# Patient Record
Sex: FEMALE
[De-identification: names, ages, dates, MRNs, and addresses within clinical notes are randomized per-mention and may not be internally consistent; named-entity substitution may affect disease eponyms.]

---

## 2019-07-15 ENCOUNTER — HOSPITAL ENCOUNTER (OUTPATIENT)
Dept: HOSPITAL 88 - OR | Age: 54
Discharge: HOME | End: 2019-07-15
Attending: INTERNAL MEDICINE
Payer: COMMERCIAL

## 2019-07-15 VITALS — SYSTOLIC BLOOD PRESSURE: 126 MMHG | DIASTOLIC BLOOD PRESSURE: 63 MMHG

## 2019-07-15 DIAGNOSIS — R13.10: ICD-10-CM

## 2019-07-15 DIAGNOSIS — F32.9: ICD-10-CM

## 2019-07-15 DIAGNOSIS — E03.9: ICD-10-CM

## 2019-07-15 DIAGNOSIS — I73.9: ICD-10-CM

## 2019-07-15 DIAGNOSIS — Z12.11: Primary | ICD-10-CM

## 2019-07-15 DIAGNOSIS — K22.2: ICD-10-CM

## 2019-07-15 DIAGNOSIS — Z80.0: ICD-10-CM

## 2019-07-15 DIAGNOSIS — K29.50: ICD-10-CM

## 2019-07-15 DIAGNOSIS — Z01.810: ICD-10-CM

## 2019-07-15 DIAGNOSIS — K20.9: ICD-10-CM

## 2019-07-15 DIAGNOSIS — Z87.11: ICD-10-CM

## 2019-07-15 DIAGNOSIS — Z86.010: ICD-10-CM

## 2019-07-15 DIAGNOSIS — K64.8: ICD-10-CM

## 2019-07-15 PROCEDURE — 45378 DIAGNOSTIC COLONOSCOPY: CPT

## 2019-07-15 PROCEDURE — 43239 EGD BIOPSY SINGLE/MULTIPLE: CPT

## 2019-07-15 PROCEDURE — 93005 ELECTROCARDIOGRAM TRACING: CPT

## 2019-07-15 PROCEDURE — 43450 DILATE ESOPHAGUS 1/MULT PASS: CPT

## 2019-07-15 NOTE — XMS REPORT
[facility]

                             Created on: 2019



Crystal Elizabeth

External Reference #: [id number]

: 1965

Sex: Female



Demographics







                          Address                   [address] [place] [address]  [address]

 

                          Home Phone                [phone]

 

                          Preferred Language        Unknown

 

                          Marital Status            Unknown

 

                          Hindu Affiliation     Unknown

 

                          Race                      Unknown

 

                          Ethnic Group              UNK





Author







                          Author                    Nahid Crystal

 

                          Organization              [employer]

 

                          Address                   Unknown

 

                          Phone                     Unavailable







Care Team Providers







                    Care Team Member Name    Role                Phone

 

                    Nahid Crystal     CP                  Unavailable



                                                                



Allergies

          No Known Allergies                                                    
                                   



Problems

          





             Problem Type    Condition    Code         Onset Dates    Condition Status

 

             Problem      Depression    F32.9                     Active

 

             Problem      History of abnormal cervical Pap smear    Z87.898                   Active

 

             Problem      Vitamin D deficiency    E55.9                     Active

 

             Problem      BMI 34.0-34.9,adult    Z68.34                    Active

 

             Problem      Acquired hypothyroidism    E03.9                     Active

 

             Problem      Obstructive sleep apnea    G47.33                    Active

 

             Problem      Uses [place] Islander as primary spoken language    Z78.9                     Active

 

             Problem      Positive MARY LOU (antinuclear antibody)    R76.8                     Active

 

             Problem      Prediabetes    R73.03                    Active

 

                    Problem             Recurrent major depressive disorder, remission status unspecified    F33.9

                                                    Active

 

             Problem      Inflammatory arthritis    M19.90                    Active

 

             Problem      Lupus        M32.9                     Active

 

             Assessment    Obstructive sleep apnea    G47.33                    Active

 

             Problem      Anxiety      F41.9                     Active

 

             Problem      Insomnia     G47.00                    Active



                                                                                
                                                                                
                                                                  



Medications

          No Known Medications                                                  
                           



Results

          No Known Results                                                      
             



Summary Purpose

          [employer]ClinicalWorks Submission

## 2019-07-15 NOTE — XMS REPORT
[facility]

                             Created on: 2014



Crystal Elizabeth

External Reference #: [id number]

: 1965

Sex: Female



Demographics







                          Address                   [address] [place]  [address]

 

                          Home Phone                [phone]

 

                          Preferred Language        Unknown

 

                          Marital Status            Unknown

 

                          Congregational Affiliation     Unknown

 

                          Race                      Unknown

 

                          Ethnic Group              /Latin





Author







                          Author                    ~~Sanjuana Yeboah

 

                          [facility]              [employer]

 

                          Address                   Unknown

 

                          Phone                     Unavailable







Care Team Providers







                    Care Team Member Name    Role                Phone

 

                    ~~Sanjuana Yeboah                      Unavailable



                                            



Encounters

          





                    Encounter           Location            Date

 

                          LEFT KNEE PAIN/ SWELLLING    [facility]

                                        Oct 29, 2014

 

                          Needs call back from Medical Staff    [facility]                             Oct 30, 2014

 

                    Needs referral      [facility]    2014



                                                                                
                           



Problems

          





             Problem Type    Condition    ICD-9 Code    Onset Dates    Condition Status

 

             Problem      BMI 31.0-31.9,adult    V85.31                    Active

 

             Problem      Depression    311                       Active

 

             Problem      Obesity      278.00                    Active

 

             Problem      Abnormal pap    796.9                     Active



                                                                                
                                     



Medications

          





        Medication    Code System    Code    Instructions    Start Date    End Date    Status    Dosage



 

           Medrol (Young)    MEDISPAN    [address]-23    4 mg Orally as directed    Oct 30, 2014    Oct

 31, 2014                 Active                    as directed



                                                                                
       



Social History

          





                    Social History Element    Qualifiers          Date Reported

 

                    children            .  3                Oct 29, 2014

 

                    Tobacco Use:        .  Are you a: never smoker    Oct 29, 2014

 

                    Marital Status:     .           Oct 29, 2014

 

                    Do you drink alcohol?    .  Status: Yes, How often? Socially    Oct 29, 2014

 

                    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



                                                                                
                           



Summary Purpose

          eClinicalWorks Submission

## 2019-07-15 NOTE — XMS REPORT
[facility]

                             Created on: 2016



Crystal Elizabeth

External Reference #: [id number]

: 1965

Sex: Female



Demographics







                          Address                   [address] [place] [address] [place]  [address]

 

                          Home Phone                [phone]

 

                          Preferred Language        Unknown

 

                          Marital Status            Unknown

 

                          Catholic Affiliation     Unknown

 

                          Race                      Unknown

 

                          Ethnic Group              /Latin





Author







                          Author                    Bianka Rocha

 

                          Organization              [employer]

 

                          Address                   Unknown

 

                          Phone                     Unavailable







Care Team Providers







                    Care Team Member Name    Role                Phone

 

                    Bianka Rocha                          Unavailable



                                                                



Allergies, Adverse Reactions, Alerts

          





                    Substance           Reaction            Event Type

 

                    N.K.D.A.            Info Not Available    Non Drug Allergy



                                                                    



Encounters

          





                    Encounter           Location            Date

 

                    WWE                 [facility]    Dec 29, 2014

 

                          SS STUDY QUESTIONAIRE     [facility]

                                        2015

 

                    LAB RESULTS         [facility]    2015

 

                    Unknown             [facility]    2015



 

                          LEFT KNEE PAIN/ SWELLLING    [facility]

                                        Oct 29, 2014

 

                    physical, wwe       [facility]    Dec 30,

 2015

 

                          Needs call back from Medical Staff    [facility]                             Oct 30, 2014

 

                          Dizziness, loosing balance when trying to stand up    [facility]           2016

 

                    Needs referral      [facility]    2014

 

                          insomnia and stress, headaches     [facility]                              Dec 28, 2015

 

                    H A/ SORE THROAT    [facility]    2015

 

                    left eye            [facility]    May 22, 2015





                                                                                
                                                                                
                                    



Problems

          





             Problem Type    Condition    ICD-9 Code    Onset Dates    Condition Status

 

             Problem      Anxiety      300.00                    Active

 

             Problem      Vitamin d deficiency    268.9                     Active

 

             Problem      Hyperlipidemia    272.4                     Active

 

             Problem      Menopause    Z78.0                     Active

 

             Problem      Insomnia     G47.00                    Active

 

             Problem      Vitamin D deficiency    E55.9                     Active

 

             Problem      History of abnormal Pap smear    V13.29                    Active

 

             Problem      Positive MARY LOU (antinuclear antibody)    795.79                    Active

 

             Problem      Depression    F32.9                     Active

 

             Problem      Anxiety      F41.9                     Active

 

             Assessment    Vertigo      R42                       Active

 

             Assessment    Dizziness    R42                       Active

 

             Assessment    Menorrhagia    N92.0                     Active

 

             Problem      BMI 31.0-31.9,adult    V85.31                    Active

 

             Problem      Obesity      278.00                    Active

 

             Problem      Spondyloarthropathy    721.90                    Active

 

             Problem      Insomnia     307.41                    Active

 

             Problem      Depression    311                       Active

 

             Problem      Prediabetes    790.29                    Active



                                                                                
                                                                                
                                                                                
                         



Medications

          





        Medication    Code System    Code    Instructions    Start Date    End Date    Status    Dosage



 

        Mirtazapine    MEDISPAN    [address]-40    45                      Active    1 tablet before bedtime

 in the evening

 

          Venlafaxine HCl ER    [facility]    [address]-22    75 mg Orally Once a day                        Active

                                        1 capsule with food

 

             HydrOXYzine HCl    [facility]     [address]-07    50 mg Orally at hour of sleep    Dec 28,

 2015                                   Active              1 tablet as needed

 

        Melatonin    [facility]    [address]-47    3 MG Orally                     Active    1 capsule at bedtime

 as needed with food



                                                                                
                                     



Social History

          





                    Social History Element    Qualifiers          Date Reported

 

                    Last Colonoscopy:    .  14

 

                    Ethnicity           .  Status , Is english your primary language? No Greenlandic    2016

 

                    children            .  3                2016

 

                    Flu Vaccine:        .  2015

 

                    Tobacco Use:        .  Are you a: never smoker    2016

 

                    Marital Status:     .           2016

 

                    Caffeine intake?    .  Status: Yes, What type: Coffee    2016

 

                    Do you exercise?    .  Answer: Yes, Type: walking, biking    2016

 

                    Do you drink alcohol?    .  Status: Yes, How often? Socially    2016

 

                    Occupation:         employed.  Housekeeping    2016



                                                                                
                                                                                
                          



Vital Signs

          





                          Date/Time:                2016

 

                          Weight                    164 lbs

 

                          Height                    61 in

 

                          Temperature               98.0 F

 

                          Cardiac Monitoring Heart Rate    60 /min

 

                          Blood Pressure Diastolic    60 mm Hg

 

                          Blood Pressure Systolic    112 mm Hg



                                                                    



Summary Purpose

          eClinicalWorks Submission

## 2019-07-15 NOTE — XMS REPORT
[facility]

                             Created on: 2014



Crystal Elizabeth

External Reference #: [id number]

: 1965

Sex: Female



Demographics







                          Address                   [address] [place] [address] [place]  [address]

 

                          Home Phone                [phone]

 

                          Preferred Language        Unknown

 

                          Marital Status            Unknown

 

                          Synagogue Affiliation     Unknown

 

                          Race                      Unknown

 

                          Ethnic Group              /Latin





Author







                          Author                    Cynthia Hedrick

 

                          Organization              [employer]

 

                          Address                   Unknown

 

                          Phone                     Unavailable







Care Team Providers







                    Care Team Member Name    Role                Phone

 

                    Cynthia Hedrick                      Unavailable



                                            



Encounters

          





                    Encounter           Location            Date

 

                          LEFT KNEE PAIN/ SWELLLING    [facility]

                                        Oct 29, 2014

 

                          Needs call back from Medical Staff    [facility]                             Oct 30, 2014

 

                    Needs referral      [facility]    2014



                                                                                
                           



Problems

          





             Problem Type    Condition    ICD-9 Code    Onset Dates    Condition Status

 

             Problem      BMI 31.0-31.9,adult    V85.31                    Active

 

             Problem      Depression    311                       Active

 

             Problem      Obesity      278.00                    Active

 

             Problem      Abnormal pap    796.9                     Active



                                                                                
                                     



Social History

          





                    Social History Element    Qualifiers          Date Reported

 

                    children            .  3                Oct 29, 2014

 

                    Tobacco Use:        .  Are you a: never smoker    Oct 29, 2014

 

                    Marital Status:     .           Oct 29, 2014

 

                    Do you drink alcohol?    .  Status: Yes, How often? Socially    Oct 29, 2014

 

                    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



                                                                                
                           



Summary Purpose

          [employer]ClinicalWorks Submission

## 2019-07-15 NOTE — XMS REPORT
Clinical Summary

                             Created on: 2018



Crystal Elizabeth

External Reference #: [id number]

: 1965

Sex: Female



Demographics







                          Address                   [address] [place]  [address]

 

                          Home Phone                [phone]

 

                          Preferred Language        Unknown

 

                          Marital Status            Single

 

                          Sikh Affiliation     CAT

 

                          Race                      Unknown

 

                          Ethnic Group              Unknown





Author







                          Author                    [facility]

 

                          Organization              [facility]

 

                          Address                   Unknown

 

                          Phone                     Unavailable







Support







                Name            Relationship    Address         Phone

 

                    HUGO PARRA    ECON                [address] [place]  [address]                [phone]

 

                    MITZY KING    ECON                [address] [place]  [address]                [phone]







Care Team Providers







                    Care Team Member Name    Role                Phone

 

                          PCP                       Unavailable







Allergies

No Known Allergies



Current Medications







      



           Prescription     Sig.      Disp.     Refills     Start     End Date     Status



                                         Date  

 

      



                     levothyroxine (SYNTHROID)        20            Active



                           50 mcg tablet             18  

 

      



              folic acid (FOLVITE) 1 mg     Take 1 tablet by mouth     90 tablet     3            20  

                                         Active



                     tabletIndications:     daily.              18  



                                         Inflammatory arthritis      

 

      



            Cyclobenzaprine     Take 1 tablet by mouth     30 tablet     1          20      Active



                     (FLEXERIL) 5 mg     nightly at bedtime as       18  



                           tabletIndications: Low     needed for Muscle Spasms.     



                                         back pain at multiple      



                                         sites      

 

      



              naproxen (NAPROSYN) 250     Take 1 tablet by mouth 2     60 tablet     4            20  

                                         Active



                     mg tabletIndications:     times daily (with meals).       18  



                                         Arthralgia of shoulder,      



                                         unspecified laterality,      



                                         Low back pain at multiple      



                                         sites      

 

      



            hydroxychloroquine     Take 1 tablet by mouth     90 tablet     3          10/22/20      Active





                     (PLAQUENIL) 200 mg     daily.              18  



                                         tabletIndications:      



                                         Systemic lupus      



                                         erythematosus,      



                                         unspecified SLE type,      



                                         unspecified organ      



                                         involvement status,      



                                         Sjogren's syndrome with      



                                         keratoconjunctivitis      



                                         sicca      

 

      



                     traZODone (DESYREL) 50 mg     Take 50 mg by mouth at         Active



                           tablet                    bedtime nightly.     

 

      



              methotrexate (RHEUMATREX)     Take 1 tablet by mouth     72 tablet     0            11/15/20  

                                         Active



                     2.5 mg tabletIndications:     weekly.             18  



                                         Medication refill      

 

      



            citalopram (CELEXA) 40 mg     Take 1 tablet by mouth     30 tablet     1          11/16/20    

 01/15/20                                Active



                 tabletIndications: MDD     daily for 60 days.       18              19 



                                         (major depressive      



                                         disorder), recurrent      



                                         episode, moderate      

 

      



            traZODone (DESYREL) 100     Take 2 tablets by mouth     60 tablet     1          11/16/20     

01/15/20                                 Active



                 mg tabletIndications: MDD     at bedtime nightly for 60       18              19 



                           (major depressive         days.     



                                         disorder), recurrent      



                                         episode, moderate      

 

      



                 METRONIDAZOLE 500 MG TAB     take 1 tablet (500 mg) by        20        Discontin



                     oral route every 8 hours        18                  ued

 

      



                 CIPROFLOXACIN 500 MG TAB     take 1 tablet (500 mg) by        20        Discontin



                     oral route every 12 hours        18                  ued

 

      



           PREVACID 30 MG     take 1 capsule (30 mg) by     30        6         20     Discontin





              CAPIndications: Reflux     oral route once daily       10           18           ued



                                         before a meal     

 

      



              amitriptyline (ELAVIL) 25     Take 2 tablets by mouth     60 tablet     1            20                  Discontin



              mg tabletIndications:     at bedtime nightly.       18           18           ued



                                         Insomnia, unspecified      



                                         type      

 

      



              methotrexate (RHEUMATREX)     Take 6 tablets by mouth     24 tablet     4            08/24/20 

                           10/22/20                  Discontin



              2.5 mg tabletIndications:     weekly.       18           18           ued



                                         Inflammatory arthritis      

 

      



                 citalopram (CELEXA) 20 mg     Take 20 mg by mouth        20        Discontin



                 tablet          daily.          18              ued







Active Problems





No known active problems



Encounters







    



              Date         Type         Specialty     Care Team     Description

 

    



                 2018      Hospital        Radiology       Nupur Henry MD 



                                         Encounter   

 

    



              2018     Office Visit     Psychiatry     Bree Gómez MD     MDD (major depressive





                           Nupur Henry MD     disorder), recurrent



                                         episode, moderate



                                         (Primary Dx)

 

    



              2018     Orders Only     Psychiatry     Bree Gómez MD     MDD (major depressive





                                         disorder), recurrent



                                         episode, moderate

 

    



                 2018      Telephone       Oseas Escalona     Interpretation

 

    



              2018     Refill       Family Practice     Gretchen Vázquez LVN     Medication refill





                                         (Primary Dx)

 

    



              10/22/2018     Office Visit     Rheumatology     Nupur Henry MD     Systemic

 lupus



                                         erythematosus,



                                         unspecified SLE type,



                                         unspecified organ



                                         involvement status



                                         (Primary Dx);



                                         Inflammatory arthritis;



                                         Urticaria, unspecified;



                                         Bilateral calcific



                                         tendinitis of shoulders;



                                         Abnormal chest x-ray;



                                         DDD (degenerative disc



                                         disease), lumbar;



                                         Abnormal LFTs;



                                         Sjogren's syndrome with



                                         keratoconjunctivitis



                                         sicca;



                                         Adjustment disorder with



                                         depressed mood

 

    



                 2018      Ancillary       Radiology       Eleanor Agarwal MD 



                                         Procedure   

 

    



              2018     Office Visit     Rheumatology     Nupur Henry MD     Systemic

 lupus



                                         erythematosus,



                                         unspecified SLE type,



                                         unspecified organ



                                         involvement status



                                         (Primary Dx);



                                         Inflammatory arthritis;



                                         Dietary counseling for



                                         Above / Below Normal BMI;





                                         Exercise counseling for



                                         Above Normal BMI Only!;



                                         BMI 34.0-34.9,adult;



                                         Arthralgia of shoulder,



                                         unspecified laterality;



                                         Low back pain at multiple



                                         sites;



                                         Screening for



                                         tuberculosis

 

    



              2018     Refill       Psychiatry     Evangelina Flores MD     Insomnia, unspecified





                                         type

 

    



              2018     Office Visit     Psychiatry     Evangelina Flores MD     Patient left 

without



                                         being seen (Primary Dx)

 

    



              2018     Hospital     Lab          Evangelina Flores MD     Insomnia, unspecified



                           Encounter                 type;



                                         MDD (major depressive



                                         disorder), recurrent



                                         episode, moderate

 

    



              2018     Office Visit     Psychiatry     Evangelina Flores MD     MDD (major depressive





                                         disorder), recurrent



                                         episode, moderate



                                         (Primary Dx);



                                         Insomnia, unspecified



                                         type

 

    



                 2018      Telephone       Angelica Palmer     Interpretation



after 2017



Social History







    



              Tobacco Use     Types        Packs/Day     Years Used     Date

 

    



                                         Never Smoker    

 

    



                                         Smokeless Tobacco: Never   



                                         Used   









                                        Tobacco Cessation: Counseling Given: Yes











   



                 Alcohol Use     Drinks/Week     oz/Week         Comments

 

   



                           Yes                       occasional









 



                           Sex Assigned at Birth     Date Recorded

 

 



                                         Not on file 







Last Filed Vital Signs







  



                     Vital Sign          Reading             Time Taken

 

  



                     Blood Pressure      99/46               2018  9:08 AM CST

 

  



                     Pulse               58                  2018  9:08 AM CST

 

  



                     Temperature         36.8 C (98.3 F)     2018  9:08 AM CST

 

  



                     Respiratory Rate     18                  2018  9:08 AM CST

 

  



                     Oxygen Saturation     96%                 2018  2:58 PM CDT

 

  



                     Inhaled Oxygen      -                   -



                                         Concentration  

 

  



                     Weight              78 kg (172 lb)      2018  9:08 AM CST

 

  



                     Height              154.9 cm (5' 1")     2018  9:08 AM CST

 

  



                     Body Mass Index     32.5                2018  9:08 AM CST







Plan of Treatment







    



              Date         Type         Specialty     Care Team     Description

 

    



                 2018      Appointment      Nupur Henry MD     full pft 6mwt



                                         [address] 



                                         [place] [address] [phone] (Fax) 

 

    



                 2019      Office Visit     Psychiatry      Bree Gómez MD 



                                         [facility]ry[facility] 



                                         [address] 



                                         [place], [place] [address] [phone] (Fax) 

 

    



                 2019      Office Visit     Psychology      Bree Gómez MD 



                                         [facility]iatry[facility] 



                                         [address] 



                                         [place], [place] [address] [phone] (Fax)

 



                                         Arabella Faria 



                                         [address], [place] [address] [phone] (Fax) 

 

    



                 2019      Office Visit     Rheumatology     Colon-Nupur West MD 



                                         [address] 



                                         [place], [place] [address] [phone] (Fax) 









   



                 Health Maintenance     Due Date        Last Done       Comments

 

   



                           Cervical Cancer Scrn (3     1984  



                                         Yrs)   

 

   



                           Breast Cancer Scrn        2003  



                                         (Yearly)   

 

   



                     Colorectal Cancer Scrn     2013          06/10/2010 



                                         Annual (FIT/FOBT) Age 50   



                                         to 73   

 

   



                           IMM Influenza Seasonal     10/01/2018  



                                         Oct to March (>/=19 yrs)   







Procedures







    



              Procedure Name     Priority     Date/Time     Associated Diagnosis     Comments

 

    



              CT CHEST W CONTRAST     Routine      2018     Systemic lupus     Results for this



                     10:01 AM CST        erythematosus,      procedure are in the



                           unspecified SLE type,     results section.



                                         unspecified organ 



                                         involvement status 



                                         Abnormal chest x-ray 

 

    



              UREA NITROGEN/CREA     Routine      10/31/2018     Systemic lupus     Results for this



                     1:59 PM CDT         erythematosus,      procedure are in the



                           unspecified SLE type,     results section.



                                         unspecified organ 



                                         involvement status 

 

    



              ANTI DSDNA BY CRITHIDIA     Routine      10/31/2018     Systemic lupus     Results for this





                     1:59 PM CDT         erythematosus,      procedure are in the



                           unspecified SLE type,     results section.



                                         unspecified organ 



                                         involvement status 

 

    



              C-REACTIVE PROT     Routine      10/31/2018     Systemic lupus     Results for this



                     1:59 PM CDT         erythematosus,      procedure are in the



                           unspecified SLE type,     results section.



                                         unspecified organ 



                                         involvement status 

 

    



              RA FACTOR     Routine      10/31/2018     Sjogren's syndrome with     Results for this



                     1:59 PM CDT         keratoconjunctivitis     procedure are in the



                           sicca                     results section.

 

    



              SJOGREN'S AB     Routine      10/31/2018     Sjogren's syndrome with     Results for this





                     1:59 PM CDT         keratoconjunctivitis     procedure are in the



                           sicca                     results section.

 

    



              CBC/DIFF     Routine      10/31/2018     Systemic lupus     Results for this



                     1:59 PM CDT         erythematosus,      procedure are in the



                           unspecified SLE type,     results section.



                                         unspecified organ 



                                         involvement status 

 

    



              LIVER PROFILE     Routine      10/31/2018     Systemic lupus     Results for this



                     1:59 PM CDT         erythematosus,      procedure are in the



                           unspecified SLE type,     results section.



                                         unspecified organ 



                                         involvement status 

 

    



              COMPLEMENT C3     Routine      10/31/2018     Systemic lupus     Results for this



                     1:59 PM CDT         erythematosus,      procedure are in the



                           unspecified SLE type,     results section.



                                         unspecified organ 



                                         involvement status 

 

    



              COMPLEMENT C4     Routine      10/31/2018     Systemic lupus     Results for this



                     1:59 PM CDT         erythematosus,      procedure are in the



                           unspecified SLE type,     results section.



                                         unspecified organ 



                                         involvement status 

 

    



                 MARY LOU             Routine         2018      Results for this



                           10:58 AM CDT              procedure are in the



                                         results section.

 

    



              T PROT/CREA RATIO,UR     Routine      2018     Systemic lupus     Results for this





                     10:58 AM CDT        erythematosus,      procedure are in the



                           unspecified SLE type,     results section.



                                         unspecified organ 



                                         involvement status 

 

    



              VIT D, 25-HYDROXY     Routine      2018     Inflammatory arthritis     Results for

 this



                           10:58 AM CDT              procedure are in the



                                         results section.

 

    



              CCP IGG ABS     Routine      2018     Systemic lupus     Results for this



                     10:58 AM CDT        erythematosus,      procedure are in the



                           unspecified SLE type,     results section.



                                         unspecified organ 



                                         involvement status 

 

    



              COMPLEMENT C4     Routine      2018     Systemic lupus     Results for this



                     10:58 AM CDT        erythematosus,      procedure are in the



                           unspecified SLE type,     results section.



                                         unspecified organ 



                                         involvement status 

 

    



              COMPLEMENT C3     Routine      2018     Systemic lupus     Results for this



                     10:58 AM CDT        erythematosus,      procedure are in the



                           unspecified SLE type,     results section.



                                         unspecified organ 



                                         involvement status 

 

    



              ANTI DSDNA BY CRITHIDIA     Routine      2018     Systemic lupus     Results for this





                     10:58 AM CDT        erythematosus,      procedure are in the



                           unspecified SLE type,     results section.



                                         unspecified organ 



                                         involvement status 

 

    



              C-REACTIVE PROT     Routine      2018     Systemic lupus     Results for this



                     10:58 AM CDT        erythematosus,      procedure are in the



                           unspecified SLE type,     results section.



                                         unspecified organ 



                                         involvement status 

 

    



              CBC/DIFF     Routine      2018     Inflammatory arthritis     Results for this



                           10:58 AM CDT              procedure are in the



                                         results section.

 

    



              COMPREHENSIVE METABOLIC     Routine      2018     Inflammatory arthritis     Results

 for this



                     PANEL(DBIL NOT INCLUDED)      10:58 AM CDT        procedure are in the



                                         results section.

 

    



              HEPATITIS PANEL     Routine      2018     Inflammatory arthritis     Results for this





                           10:58 AM CDT              procedure are in the



                                         results section.

 

    



              XRAY SHOULDER 2 VIEWS MIN     Routine      2018     Arthralgia of shoulder,     Results

 for this



                     10:34 AM CDT        unspecified laterality     procedure are in the



                                         results section.

 

    



              XRAY SHOULDER 2 VIEWS MIN     Routine      2018     Arthralgia of shoulder,     Results

 for this



                     10:34 AM CDT        unspecified laterality     procedure are in the



                                         results section.

 

    



              XRAY CHEST 2 VIEWS     Routine      2018     Screening for     Results for this



                     10:34 AM CDT        tuberculosis        procedure are in the



                                         results section.

 

    



              XRAY SPINE LUMBOSACRAL     Routine      2018     Low back pain at multiple     Results

 for this



                 AP-LAT          10:34 AM CDT     sites           procedure are in the



                                         results section.

 

    



              VIT D, 25-HYDROXY     Routine      2018     Insomnia, unspecified     Results for 

this



                     4:08 PM CDT         type                procedure are in the



                           MDD (major depressive     results section.



                                         disorder), recurrent 



                                         episode, moderate 

 

    



              COMPREHENSIVE METABOLIC     Routine      2018     Insomnia, unspecified     Results

 for this



                 PANEL(DBIL NOT INCLUDED)      4:08 PM CDT     type            procedure are in the



                                         results section.

 

    



              CBC/DIFF     Routine      2018     Insomnia, unspecified     Results for this



                     4:08 PM CDT         type                procedure are in the



                                         results section.

 

    



              TSH          Routine      2018     Insomnia, unspecified     Results for this



                     4:08 PM CDT         type                procedure are in the



                                         results section.



after 2017



Results

* CT CHEST W CONTRAST (2018 10:01 AM)





 



                           Impressions               Performed At

 

 



                           IMPRESSION:               [facility]



                                         1.Lungs are clear. 



                                         2.Nonspecific mildly enlarged left axillary lymph nodes. 



                                         3.Hepatic steatosis. 



                                         Signed By: Fabio Arnett M.D., 2018 10:38 AM 









 



                           Narrative                 Performed At

 

 



                           EXAM:CT CHEST W CONTRAST     [facility]



                                         DATE: 2018 10:02 AM 



                                         INDICATION: sle with abnormal chest x ray 



                                         ADDITIONAL INFORMATION:None. 



                                         TECHNIQUE: Post IV contrast CT chest acquisition with sagittal and 



                                         coronal reformats and axial MIPPED images rendered. 



                                         COMPARISON: Chest x-ray 2018 



                                         DISCUSSION: 



                                         Visualized lower neck. Unremarkable. 



                                         Thyroid.The visible portions are unremarkable. 



                                         Aorta.No evidence of aortic aneurysm or dissection. Mild aortic 



                                         atherosclerotic calcifications. 



                                         Pulmonary arteries.Pulmonary arteries are grossly unremarkable. 



                                         Trachea and proximal airways.Trachea and proximal airways are patent. 



                                         Lungs. Unremarkable. 



                                         Thoracic lymph nodes.No adenopathy is seen. Nonspecific mildly 



                                         enlarged left axillary lymph nodes measuring up to 1.2 cm. 



                                         Visualized subdiaphragmatic structures.Hepatic steatosis. 



                                         Soft tissues.Unremarkable. 



                                         Regional Skeleton.No lytic or blastic lesions. 









                                        Procedure Note

 

                                        



Interface, Rad/Mammog In - 2018 10:43 AM CST





EXAM:  CT CHEST W CONTRAST



DATE: 2018 10:02 AM 



INDICATION: sle with abnormal chest x ray



ADDITIONAL INFORMATION:None.



TECHNIQUE: Post IV contrast CT chest acquisition with sagittal and

coronal reformats and axial MIPPED images rendered.



COMPARISON: Chest x-ray 2018



DISCUSSION:  



Visualized lower neck. Unremarkable.



Thyroid.  The visible portions are unremarkable.



Aorta.No evidence of aortic aneurysm or dissection. Mild aortic

atherosclerotic calcifications.



Pulmonary arteries.Pulmonary arteries are grossly unremarkable.



Trachea and proximal airways.Trachea and proximal airways are patent.



Lungs. Unremarkable.



Thoracic lymph nodes.  No adenopathy is seen. Nonspecific mildly

enlarged left axillary lymph nodes measuring up to 1.2 cm.



Visualized subdiaphragmatic structures.  Hepatic steatosis.



Soft tissues.  Unremarkable.



Regional Skeleton.  No lytic or blastic lesions.



IMPRESSION

IMPRESSION: 

                                        1.  Lungs are clear.

                                        2.  Nonspecific mildly enlarged left axillary lymph nodes.

                                        3.  Hepatic steatosis.



 



Signed By: Fabio Arnett M.D., 2018 10:38 AM











   



                 Performing Organization     Address         [facility] [place]     Phone Number

 

   



                                         [facility]   





* SJOGREN'S AB (10/31/2018  1:59 PM)





   



                 Component       Value           Ref Range       Performed At

 

   



                     Anti SS/A           >8.0                LABORATORY



                           Reference range: 0.0 to 0.9      [facility]



                           Unit: AI                  [place]

 

   



                     Anti SS/B           >8.0                [facility]ORY



                           Reference range: 0.0 to 0.9      [facility]



                           Unit: AI                  [place]













                                         Specimen

 





                                         Blood bag - BLOOD









   



                 Performing Organization     Address         [facility] [place]     Phone Number

 

   



                                         [employer]      [address], [place] [address] [phone]



                           [place]                   [address]  





* RA FACTOR (10/31/2018  1:59 PM)





   



                 Component       Value           Ref Range       Performed At

 

   



                 RA Factor       12              <14 IU/mL       [facility]













                                         Specimen

 





                                         Blood









   



                 Performing Organization     Address         [facility] [place]     Phone Number

 

   



                                         [employer]   

 

   



                                         [facility]   





* LIVER PROFILE (10/31/2018  1:59 PM)





   



                 Component       Value           Ref Range       Performed At

 

   



                 T Protein       8.2             6.0 - 8.3 g/dL     [facility]

 

   



                 Albumin         4.3             3.7 - 5.3 g/dL     [facility]

 

   



                 T Bilirubin     0.5             0.2 - 1.2 mg/dL     [facility]

 

   



                 Alk Phos        111 (H)         34 - 104 U/L     [facility]

 

   



                 AST             27              13 - 39 U/L     [facility]

 

   



                 ALT             26              7 - 52 U/L      [facility]

 

   



                 D Bilirubin     0.1             0.0 - 0.2 mg/dL     [facility]













                                         Specimen

 





                                         Blood









   



                 Performing Organization     Address         [facility]     Phone Number

 

   



                                         [facility]   

 

   



                                          [facility]   





* ANTI DSDNA BY CRITHIDIA (10/31/2018  1:59 PM)



Only the most recent of 2 results within the time period is included.





   



                 Component       Value           Ref Range       Performed At

 

   



                 Anti dsDNA      Negative        NEG Titer       [facility]



                                         IMMUNOLOGY













                                         Specimen

 





                                         Blood









   



                 Performing Organization     Address         [facility]     Phone Number

 

   



                                         [facility]   

 

   



                                          DIAGNOSTIC IMMUNOLOGY   





* C-REACTIVE PROT (10/31/2018  1:59 PM)



Only the most recent of 2 results within the time period is included.





   



                 Component       Value           Ref Range       Performed At

 

   



                 C-Reactive Prot     0.7             <10 mg/dL       [facility]









   



                 Performing Organization     Address         [facility] [place]     Phone Number

 

   



                                         [facility]   

 

   



                                         [facility]   





* CBC/DIFF (10/31/2018  1:59 PM)



Only the most recent of 3 results within the time period is included.





   



                 Component       Value           Ref Range       Performed At

 

   



                 WBC             7.2             4.5 - 11.0 K/uL     [facility]

 

   



                 RBC             4.43            4.20 - 5.40 M/uL     [facility]

 

   



                 Hemoglobin      13.2            12.0 - 16.0 g/dL     [facility]

 

   



                 Hematocrit      39.4            37.0 - 47.0 %     [facility]

 

   



                 MCV             89              82 - 92 fL      [facility]

 

   



                 MCH             29.8            27.0 - 32.0 pg     [facility]

 

   



                 MCHC            33.5            32.0 - 36.0 g/dL     [facility]

 

   



                 RDW             45.7            36.4 - 46.3 fL     [facility]

 

   



                 Platelet        244             150 - 400 K/uL     [facility]

 

   



                 Mean Platelet Volume     11.6            9.4 - 12.4 fL     [facility]

 

   



                     Percent NRBC        0.0                 [facility]

 

   



                     Absolute NRBC       0.00                [facility]

 

   



                 Neutrophil      70.0            34.0 - 70.0 %     [facility]

 

   



                 Lymphocyte      22.7            20.0 - 50.0 %     [facility]

 

   



                 Monocyte        5.3             5.0 - 12.0 %     [facility]

 

   



                 Eosinophil      1.5             0.7 - 5.0 %     [facility]

 

   



                 Basophil        0.4             0.1 - 1.2 %     [facility]

 

   



                 Pct Immat Gran     0.1             0.0 - 0.5       [facility]

 

   



                 Neutrophil, Abs     5.05            1.56 - 6.13 K/uL     [facility]

 

   



                 Lymphocyte, Abs     1.64            1.18 - 3.74 K/uL     [facility]

 

   



                 Monocyte, Abs     0.38 (H)        0.24 - 0.36 K/uL     [facility]

 

   



                 Eosinophil, Abs     0.11            0.04 - 0.36 K/uL     [facility]

 

   



                 Basophil, Abs     0.03            0.01 - 0.08 K/uL     [facility]

 

   



                 Absol Immat Gran     0.01            0.00 - 0.03 K/uL     [facility]













                                         Specimen

 





                                         Blood









   



                 Performing Organization     Address         [facility]     Phone Number

 

   



                                         [facility]   

 

   



                                         [facility]   





* COMPLEMENT C4 (10/31/2018  1:59 PM)



Only the most recent of 2 results within the time period is included.





   



                 Component       Value           Ref Range       Performed At

 

   



                 Complement C4     41.0            19 - 52 mg/dL     [facility]













                                         Specimen

 





                                         Blood









   



                 Performing Organization     Address         [facility]     Phone Number

 

   



                                         [facility]   

 

   



                                         [facility]   





* COMPLEMENT C3 (10/31/2018  1:59 PM)



Only the most recent of 2 results within the time period is included.





   



                 Component       Value           Ref Range       Performed At

 

   



                 Complement C3     175.4           87 - 200 mg/dL     [facility]













                                         Specimen

 





                                         Blood









   



                 Performing Organization     Address         [facility]     Phone Number

 

   



                                         [facility]   

 

   



                                         [facility]   





* UREA NITROGEN/CREA (10/31/2018  1:59 PM)





   



                 Component       Value           Ref Range       Performed At

 

   



                 Urea Nitrogen     18              7 - 25 mg/dL     [facility]

 

   



                 Creatinine      0.60            0.6 - 1.2 mg/dL     [facility]

 

   



                 GFR, Estimated     >60             mL/min/1.73 m2     [facility]

 

   



                 GFR, Estim, Afr-Am     >60             mL/min/1.73 m2     [facility]













                                         Specimen

 





                                         Other (Specify in



                                         Comments)









   



                 Performing Organization     Address         City/State/Zipcode     Phone Number

 

   



                                         [facility]   

 

   



                                         [facility]   





* VIT D, 25-HYDROXY (2018 10:58 AM)



Only the most recent of 2 results within the time period is included.





   



                 Component       Value           Ref Range       Performed At

 

   



                 Vit D, 25-Hydroxy     34.0            30 - 100 ng/mL     [facility]



                           Comment:                  IMMUNOLOGY



                                         Vitamin D deficiency has been  



                                         defined by the [place] of  



                                         Medicine and  



                                         Endocrine Society guideline  



                                         as a level of serum 25-OH  



                                         Vitamin D less than 20  



                                         ng/mL. The Endocrine Society  



                                         further defines Vitamin D  



                                         insufficiency as a  



                                         level between 21 and 29 ng/mL  



                                         and sufficiency as a level  



                                         between 30 and 100  



                                         ng/mL.  









   



                 Performing Organization     Address         [facility]     Phone Number

 

   



                                         [facility]   

 

   



                                         [facility]   





* COMPREHENSIVE METABOLIC PANEL(DBIL NOT INCLUDED) (2018 10:58 AM)



Only the most recent of 2 results within the time period is included.





   



                 Component       Value           Ref Range       Performed At

 

   



                 Albumin         4.6             3.7 - 5.3 g/dL     [facility]

 

   



                 Calcium         9.0             8.6 - 10.3 mg/dL     [facility]

 

   



                 CO2             31              21 - 31 mmol/L     [facility]

 

   



                 Chloride        99              98 - 107 mmol/L     [facility]

 

   



                 Creatinine      0.50 (L)        0.6 - 1.2 mg/dL     [facility]

 

   



                 Glucose         84              70 - 110 mg/dL     [facility]

 

   



                 Alk Phos        90              34 - 104 U/L     [facility]

 

   



                 Potassium       4.6             3.5 - 5.1 mmol/L     [facility]

 

   



                 Sodium          139             136 - 145 mmol/L     [facility]

 

   



                 ALT             66 (H)          7 - 52 U/L      [facility]

 

   



                 AST             55 (H)          13 - 39 U/L     [facility]

 

   



                 Urea Nitrogen     7               7 - 25 mg/dL     [facility]

 

   



                 T Bilirubin     0.6             0.2 - 1.2 mg/dL     [facility]

 

   



                 T Protein       8.1             6.0 - 8.3 g/dL     [facility]

 

   



                 GFR, Estimated     >60             mL/min/1.73 m2     [facility]

 

   



                 GFR, Estim, Afr-Am     >60             mL/min/1.73 m2     [facility]

 

   



                     Anion Gap           9                   [facility]













                                         Specimen

 





                                         Blood









   



                 Performing Organization     Address         City/State/[facility] [place]     Phone Number

 

   



                                         [facility]   

 

   



                                         [facility]   





* CCP IGG ABS (2018 10:58 AM)





   



                 Component       Value           Ref Range       Performed At

 

   



                     CCP Abs IgG/IgA     9                   [facility]



                           Reference range: 0 to 19      [facility]



                           Unit: units               [place]



                                         (note)  



                                           



                                           



                                         Negative  



                                          <20  



                                           



                                          Weak  



                                         [id number] - 39  



                                           



                                          Moderate  



                                         [id number] - 59  



                                           



                                          Strong  



                                         positive>59  













                                         Specimen

 





                                         Blood









   



                 Performing Organization     Address         City/State/[facility] [place]     Phone Number

 

   



                                         [facility]   

 

   



                 [facility]     [address], [place] [address]     

[phone]



                           [place]                   [address]  





* T PROT/CREA RATIO,UR (2018 10:58 AM)





   



                 Component       Value           Ref Range       Performed At

 

   



                 Creatinine, Ur     114.2           20 - 320 mg/dL     [facility]

 

   



                 T Prot, Ur      0.09            g/L             [facility]

 

   



                 T Prot/Crea Ratio,Ur     0.08            0.0 - 0.5       [facility]













                                         Specimen

 





                                         Urine









   



                 Performing Organization     Address         City/State/[facility] [place]     Phone Number

 

   



                                         [facility]   

 

   



                                         [facility]   





* HEPATITIS PANEL (2018 10:58 AM)





   



                 Component       Value           Ref Range       Performed At

 

   



                 HCV IgG         Negative        NEG             [facility]

 

   



                 HBsAg           Negative        NEG             [facility]

 

   



                 HAV, IgM        Negative        NEG             [facility]

 

   



                 HBcAb, IgM      Negative        NEG             [facility]













                                         Specimen

 





                                         Blood









   



                 Performing Organization     Address         City/State/[facility] [place]     Phone Number

 

   



                                         [facility]   

 

   



                                         [facility]   





* MARY LOU (2018 10:58 AM)





   



                 Component       Value           Ref Range       Performed At

 

   



                 MARY LOU Screen      Positive (A)     NEG             [facility]



                                         IMMUNOLOGY

 

   



                 MARY LOU Pattern     SSA/Ro present, not titered     Pattern         [facility]



                                         IMMUNOLOGY









   



                 Performing Organization     Address         City/State/[facility] [place]     Phone Number

 

   



                                         [facility]   

 

   



                                         [facility]   





* XRAY CHEST 2 VIEWS (2018 10:34 AM)





 



                           Impressions               Performed At

 

 



                           IMPRESSION:               [facility]



                                         1.Persistent mild right medial infrahilar opacities, likely 



                                         atelectasis/scarring. 



                                         2.No consolidations, cavitary lesions, pleural effusions. CT could be 



                                         obtained for further evaluation if indicated. 



                                         Dictated By: Zachary Haro MD, 2018 11:45 AM 



                                         I have reviewed the study and agree with the findings in this report. 



                                         Signed By: Donavon Gatica MD, 2018 11:46 AM 









 



                           Narrative                 Performed At

 

 



                           EXAMINATION:XRAY CHEST 2 VIEWS     [facility]



                                         INDICATION: screen tb 



                                         COMPARISON:10/21/2012 



                                          



                                         FINDINGS: 



                                         Devices, Lines, and Tubes: None. 



                                         Heart and Mediastinum: Unremarkable. 



                                         Lungs and Pleura: Persistent mild infrahilar opacities in the right 



                                         medial lung. Few bilateral scattered calcified granulomas, the greatest 



                                         is noted to be in the left upper lung. No pneumothoraces. 



                                         Bones and Soft Tissues: Moderate degenerative changes of the superior 



                                         costochondral joints bilaterally. Moderate anterior osteophytosis of the 



                                         mid thoracic spine. 









                                        Procedure Note

 

                                        



Interface, Rad/Mammog In - 2018 11:52 AM CDT



EXAMINATION:  XRAY CHEST 2 VIEWS    



INDICATION: screen tb  



COMPARISON:  10/21/2012

     

FINDINGS: 

Devices, Lines, and Tubes: None.



Heart and Mediastinum: Unremarkable.



Lungs and Pleura: Persistent mild infrahilar opacities in the right

medial lung. Few bilateral scattered calcified granulomas, the greatest

is noted to be in the left upper lung. No pneumothoraces.



Bones and Soft Tissues: Moderate degenerative changes of the superior

costochondral joints bilaterally. Moderate anterior osteophytosis of the

mid thoracic spine.



IMPRESSION

IMPRESSION: 

                                        1.  Persistent mild right medial infrahilar opacities, likely

atelectasis/scarring.

                                        2.  No consolidations, cavitary lesions, pleural effusions. CT could be

obtained for further evaluation if indicated.



Dictated By: Zachary Haro MD, 2018 11:45 AM



I have reviewed the study and agree with the findings in this report.



Signed By: Donavon Gatica MD, 2018 11:46 AM











   



                 Performing Organization     Address         City/State/Zipcode     Phone Number

 

   



                                         [facility]   





* XRAY SHOULDER 2 VIEWS MIN (2018 10:34 AM)



Only the most recent of 2 results within the time period is included.





 



                           Impressions               Performed At

 

 



                           IMPRESSION:               [facility]



                                         1.No acute radiographic abnormality. 



                                         2.Calcific tendinopathy of the rotator cuff. 



                                         Dictated By: Jessica Moon MD, 2018 11:40 AM 



                                         I have reviewed the study and agree with the findings in this report. 



                                         Signed By: Deonte Barbosa DO, 2018 2:54 PM 









 



                           Narrative                 Performed At

 

 



                           EXAM: RIGHT XRAY SHOULDER 2 VIEWS MIN     [facility]



                                         CLINICAL INDICATION:pain 



                                         COMPARISON: None. 



                                         DISCUSSION: 



                                          



                                         No fracture or dislocation. 



                                         Mild degenerative changes of the shoulder and acromioclavicular joint. 



                                         Calcific tendinopathy the rotator cuff. 









                                        Procedure Note

 

                                        



Interface, Rad/Mammog In - 2018  2:59 PM CDT



EXAM: RIGHT XRAY SHOULDER 2 VIEWS MIN 



CLINICAL INDICATION:  pain 



COMPARISON: None.



DISCUSSION:  

    

No fracture or dislocation. 

Mild degenerative changes of the shoulder and acromioclavicular joint.

Calcific tendinopathy the rotator cuff.



IMPRESSION

IMPRESSION:



                                        1.  No acute radiographic abnormality.

                                        2.  Calcific tendinopathy of the rotator cuff.



Dictated By: Jessica Moon MD, 2018 11:40 AM



I have reviewed the study and agree with the findings in this report.



Signed By: Deonte Barbosa DO, 2018 2:54 PM











   



                 Performing Organization     Address         City/State/Zipcode     Phone Number

 

   



                                         [facility]   





* XRAY SPINE LUMBOSACRAL AP-LAT (2018 10:34 AM)





 



                           Impressions               Performed At

 

 



                           IMPRESSION:               [facility]



                                         1.No acute radiographic abnormality. 



                                         2.Mild degenerative changes at L5-S1. 



                                         Dictated By: Jessica Moon MD, 2018 11:38 AM 



                                         I have reviewed the study and agree with the findings in this report. 



                                         Signed By: Deonte Barbosa DO, 2018 2:54 PM 









 



                           Narrative                 Performed At

 

 



                           Lumbar Spine Radiographs - 3 view(s)     [facility]



                                         HISTORY:back pain 



                                         COMPARISON: None 



                                         DISCUSSION: 



                                         Bone: 



                                         Osseous structures are partially obscured by stool and bowel gas. 



                                         Five nonrib-bearing lumbar vertebral bodies. 



                                         Normal alignment. 



                                         No displaced fracture or compression deformity. 



                                         Discs: 



                                         Mild disc space narrowing at L5-S1. 



                                         Joints: 



                                         Mild degenerative changes of the L5-S1 facets. 









                                        Procedure Note

 

                                        



Interface, Rad/Mammog In - 2018  2:59 PM CDT



Lumbar Spine Radiographs - 3 view(s)



HISTORY:  back pain



COMPARISON: None



DISCUSSION:

Bone:

Osseous structures are partially obscured by stool and bowel gas.

Five nonrib-bearing lumbar vertebral bodies.

Normal alignment.

No displaced fracture or compression deformity.



Discs:

Mild disc space narrowing at L5-S1.



Joints:

Mild degenerative changes of the L5-S1 facets.



IMPRESSION

IMPRESSION:

                                        1.  No acute radiographic abnormality. 

                                        2.  Mild degenerative changes at L5-S1.



Dictated By: Jessica Moon MD, 2018 11:38 AM



I have reviewed the study and agree with the findings in this report.



Signed By: Deonte Barbosa DO, 2018 2:54 PM











   



                 Performing Organization     Address         [facility]     Phone Number

 

   



                                         [facility]   





* TSH (2018  4:08 PM)





   



                 Component       Value           Ref Range       Performed At

 

   



                 TSH             1.87            0.57 - 3.74 uIU/mL     [facility]













                                         Specimen

 





                                         Blood









   



                 Performing Organization     Address         [facility]     Phone Number

 

   



                                         [facility]   

 

   



                                         [facility]   





after 2017

## 2019-07-15 NOTE — XMS REPORT
[facility]

                             Created on: 2015



Crystal Elizabeth

External Reference #: [id number]

: 1965

Sex: Female



Demographics







                          Address                   [address] [place] [address] [place]  [address]

 

                          Home Phone                [phone]

 

                          Preferred Language        Unknown

 

                          Marital Status            Unknown

 

                          Anabaptist Affiliation     Unknown

 

                          Race                      Unknown

 

                          Ethnic Group              /Latin





Author







                          Author                    Genevieve Purcell

 

                          Organization              [employer]

 

                          Address                   Unknown

 

                          Phone                     Unavailable







Care Team Providers







                    Care Team Member Name    Role                Phone

 

                    Genevieve Purcell    CP                  Unavailable



                                                                



Allergies, Adverse Reactions, Alerts

          





                    Substance           Reaction            Event Type

 

                    N.K.D.A.            Info Not Available    Non Drug Allergy



                                                                    



Encounters

          





                    Encounter           Location            Date

 

                    [facility]                 [facility]    Dec 29, 2014

 

                          SS STUDY QUESTIONAIRE     [facility]

                                        2015

 

                    LAB RESULTS         [facility]    2015

 

                    Unknown             [facility]    2015



 

                          LEFT KNEE PAIN/ SWELLLING    [facility]

                                        Oct 29, 2014

 

                          Needs call back from Medical Staff    [facility]                             Oct 30, 2014

 

                    Needs referral      [facility]    2014

 

                    H A/ SORE THROAT    [facility]    2015

 

                    left eye            [facility]    May 22, 2015





                                                                                
                                                                                
      



Problems

          





             Problem Type    Condition    ICD-9 Code    Onset Dates    Condition Status

 

             Problem      Depression    311                       Active

 

             Problem      Obesity      278.00                    Active

 

             Problem      BMI 31.0-31.9,adult    V85.31                    Active

 

             Problem      Positive MARY LOU (antinuclear antibody)    795.79                    Active

 

             Problem      Vitamin d deficiency    268.9                     Active

 

             Problem      History of abnormal Pap smear    V13.29                    Active

 

             Problem      Prediabetes    790.29                    Active

 

             Problem      Insomnia     307.41                    Active

 

             Problem      Hyperlipidemia    272.4                     Active

 

             Problem      Anxiety      300.00                    Active

 

             Assessment    Upper respiratory infection    465.9                     Active

 

             Assessment    Sore throat    462                       Active

 

             Assessment    Bacterial conjunctivitis of left eye    372.39                    Active

 

             Problem      Spondyloarthropathy    721.90                    Active



                                                                                
                                                                                
                                                        



Medications

          





        Medication    Code System    Code    Instructions    Start Date    End Date    Status    Dosage



 

          Venlafaxine HCl ER    [facility]    [address]-96    75 MG Orally Once a day                        Active

                                        1 capsule with food

 

        Folic Acid    [facility]    [address]-55    1 MG Orally Once a day                    Active    1 tablet



 

          Methotrexate    [facility]    [address]-63    2.5 MG Orally every wednesday                        Active

                                        5 capsules

 

           Ceftin     [facility]    [address]-32    500 mg Orally Twice a day    May 22, 2015    Beba 01,

 2015                     Active                    1 tablet

 

          Alprazolam    [facility]    [address]-61    0.5 MG Orally QHS    2015              Active 

                                        1 tablet

 

        Norco    [facility]    [address]-06    5-325 MG Orally every 6 hrs                    Active    1 tablet

 as needed

 

                Vitamin D (Ergocalciferol)    [facility]        [address]-95    63806 UNIT Orally once per 

week            2015    Aug 02, 2015    Active          1 capsule

 

             Bromfed DM    [facility]     [address]-61    30-2-10 MG/5ML Orally every 6 hrs    May 22, 

2015                2015       Active              10 ml as needed

 

                Polytrim        [facility]        [address]-74    19620-9.1 UNIT/ML-% Ophthalmic Six times a day

                May 22, 2015    May 29, 2015    Active          1 drop into affected eye



                                                                                
                                                                                
      



Social History

          





                    Social History Element    Qualifiers          Date Reported

 

                    Flu Vaccine:        .  no               May 22, 2015

 

                    Last Colonoscopy:    .  12/20/14         May 22, 2015

 

                    children            .  3                May 22, 2015

 

                    Tobacco Use:        .  Are you a: never smoker    May 22, 2015

 

                    Marital Status:     .           May 22, 2015

 

                    Do you drink alcohol?    .  Status: Yes, How often? Socially    May 22, 2015

 

                    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



                                                                                
                                                                             



Family history

          





                Qualifier       Description     Comment         Date Reported

 

                Maternal Grandmother                    Comment not available    May 22, 2015

 

                Paternal Grandmother                    Comment not available    May 22, 2015

 

                Siblings        alive           throat cancer    May 22, 2015

 

                Maternal Grandfather                    Comment not available    May 22, 2015

 

                Children        alive           Comment not available    May 22, 2015

 

                Father          alive           Comment not available    May 22, 2015

 

                Paternal Grandfather                    Comment not available    May 22, 2015

 

                Mother          alive           Parkinsons, alzheimer    May 22, 2015

 

                Other:                          Comment not available    May 22, 2015



                                                                                
                                                                                
                



Vital Signs

          





                          Date/Time:                May 22, 2015

 

                          Weight                    162 lbs

 

                          Height                    61 in

 

                          Cardiac Monitoring Heart Rate    60 /min

 

                          Blood Pressure Diastolic    64 mm Hg

 

                          Blood Pressure Systolic    110 mm Hg



                                                                    



Results

          





                                         

 

                                        RAPID STREP

 

                          Negative(- )              NEGATIVE



                                                                    



Summary Purpose

          [employer] Submission

## 2019-07-15 NOTE — XMS REPORT
[facility]

                             Created on: 01/10/2015



Crystal Elizabeth

External Reference #: [id number]

: 1965

Sex: Female



Demographics







                          Address                   [address] [place] [address] [place]  [address]

 

                          Home Phone                [phone]

 

                          Preferred Language        Unknown

 

                          Marital Status            Unknown

 

                          Synagogue Affiliation     Unknown

 

                          Race                      Unknown

 

                          Ethnic Group              /Latin





Author







                          Author                    Edwin Garrido

 

                          Organization              [employer]

 

                          Address                   Unknown

 

                          Phone                     Unavailable







Care Team Providers







                    Care Team Member Name    Role                Phone

 

                    Edwin Garrido     CP                  Unavailable



                                                                



Allergies, Adverse Reactions, Alerts

          





                    Substance           Reaction            Event Type

 

                    N.K.D.A.            Info Not Available    Non Drug Allergy



                                                                    



Encounters

          





                    Encounter           Location            Date

 

                    [facility]                 [facility]    Dec 29, 2014

 

                          SS STUDY QUESTIONAIRE     [facility]

                                        2015

 

                    LAB RESULTS         [facility]    2015

 

                          LEFT KNEE PAIN/ SWELLLING    [facility]

                                        Oct 29, 2014

 

                          Needs call back from Medical Staff    [facility]                             Oct 30, 2014

 

                    Needs referral      [facility]    2014



                                                                                
                                                         



Problems

          





             Problem Type    Condition    ICD-9 Code    Onset Dates    Condition Status

 

             Assessment    Hyperlipidemia    272.4                     Active

 

             Assessment    Positive MARY LOU (antinuclear antibody)    795.79                    Active

 

             Assessment    Excessive daytime sleepiness    780.54                    Active

 

             Assessment    Prediabetes    790.29                    Active

 

             Assessment    History of abnormal Pap smear    V13.29                    Active

 

             Assessment    Obesity      278.00                    Active

 

             Assessment    Depression    311                       Active

 

             Assessment    Anxiety      300.00                    Active

 

             Assessment    Vitamin d deficiency    268.9                     Active

 

             Assessment    BMI 31.0-31.9,adult    V85.31                    Active

 

             Assessment    Insomnia     307.41                    Active



                                                                                
                                                                                
                          



Social History

          





                    Social History Element    Qualifiers          Date Reported

 

                    children            .  3                2015

 

                    Tobacco Use:        .  Are you a: never smoker    2015

 

                    Marital Status:     .           2015

 

                    Do you drink alcohol?    .  Status: Yes, How often? Socially    2015

 

                    Occupation:         employed.  Housekeeping    2015



                                                                                
                                                         



Vital Signs

          





                          Date/Time:                2015

 

                          Weight                    168 lbs

 

                          Height                    61 in

 

                          Cardiac Monitoring Heart Rate    60 /min

 

                          Blood Pressure Diastolic    62 mm Hg

 

                          Blood Pressure Systolic    100 mm Hg



                                                                    



Summary Purpose

          [employer] Submission

## 2019-07-15 NOTE — XMS REPORT
[facility]

                             Created on: 10/23/2018



Crystal Elizabeth

External Reference #: [id number]

: 1965

Sex: Female



Demographics







                          Address                   [address] [place] [address]  [address]

 

                          Home Phone                [phone]

 

                          Preferred Language        Unknown

 

                          Marital Status            Unknown

 

                          Denominational Affiliation     Unknown

 

                          Race                      Unknown

 

                          Ethnic Group              /Latin





Author







                          Author                    Nahid Crystal

 

                          Organization              [employer]

 

                          Address                   Unknown

 

                          Phone                     Unavailable







Care Team Providers







                    Care Team Member Name    Role                Phone

 

                    Nahid Crystal     CP                  Unavailable



                                                                



Allergies

          No Known Allergies                                                    
                                   



Problems

          





             Problem Type    Condition    Code         Onset Dates    Condition Status

 

             Problem      Insomnia     G47.00                    Active

 

             Problem      Vitamin D deficiency    E55.9                     Active

 

             Problem      Depression    F32.9                     Active

 

             Problem      Acquired hypothyroidism    E03.9                     Active

 

             Problem      Positive MARY LOU (antinuclear antibody)    R76.8                     Active

 

             Problem      BMI 34.0-34.9,adult    Z68.34                    Active

 

                    Problem             Recurrent major depressive disorder, remission status unspecified    F33.9

                                                    Active

 

             Problem      Uses Syriac as primary spoken language    Z78.9                     Active

 

             Problem      History of abnormal cervical Pap smear    Z87.898                   Active

 

             Problem      Prediabetes    R73.03                    Active

 

             Assessment    Acquired hypothyroidism    E03.9                     Active

 

             Problem      Inflammatory arthritis    M19.90                    Active

 

             Problem      Lupus        M32.9                     Active

 

             Assessment    Prediabetes    R73.03                    Active

 

             Problem      Anxiety      F41.9                     Active



                                                                                
                                                                                
                                                                  



Medications

          





        Medication    Code System    Code    Instructions    Start Date    End Date    Status    Dosage



 

          Citalopram Hydrobromide    NDC       52746461170    20 mg orally Once a day                        Active

                                        1 tablet

 

             Synthroid    ND          61380133553    50 MCG Orally qd  LAST REFILL, NEEDS BLOOD WORK    May

 15, 2018                               Active              1 tablet on an empty stomach in the morning

 

                MetFORMIN HCl ER    ND             65542102713     750 MG Orally qd  LAST REFILL, NEEDS TO BE SEEN

                Aug 06, 2018                    Active          1 tablet with evening meal



                                                                                
                 



Results

          No Known Results                                                      
             



Summary Purpose

          [employer] Submission

## 2019-07-15 NOTE — XMS REPORT
Cachorro Valentine MD

                             Created on: 2017



ADILSON GRIFFITH

External Reference #: [id number]

: 1965

Sex: Female



Demographics







                          Address                   [address] [place] [address] 

[place], [place]  [id number]

 

                          Home Phone                [phone]

 

                          Preferred Language        Unknown

 

                          Marital Status            Unknown

 

                          Mosque Affiliation     Unknown

 

                          Race                      Unknown

 

                          Ethnic Group              /Latin





Author







                          Cachorro Mitchell

 

                          Organization              [employer]

 

                          Address                   Unknown

 

                          Phone                     Unavailable







Care Team Providers







                    Care Team Member Name    Role                Phone

 

                    Cachorro Valentine     CP                  Unavailable



                                                                



Allergies

          No Known Allergies                                                    
                                   



Problems

          





             Problem Type    Condition    Code         Onset Dates    Condition Status

 

             Assessment    Inflammatory arthritis    M19.90                    Active

 

             Problem      Rash         R21                       Active

 

             Problem      Polyarthritis    M13.0                     Active

 

             Assessment    Long-term use of high-risk medication    Z79.899                   Active

 

             Assessment    Lupus        M32.9                     Active

 

             Problem      De Quervain's tenosynovitis    M65.4                     Active

 

             Problem      Long-term use of high-risk medication    Z79.899                   Active

 

             Problem      Fibromyalgia    M79.7                     Active

 

             Problem      Inflammatory arthritis    M19.90                    Active

 

             Problem      Raised antibody titer    R76.0                     Active

 

             Problem      Screening for osteoporosis    Z13.820                   Active

 

             Problem      Lupus        M32.9                     Active



                                                                                
                                                                                
                                    



Medications

          No Known Medications                                                  
                           



Results

          No Known Results                                                      
             



Summary Purpose

          [employer] Submission

## 2019-07-15 NOTE — XMS REPORT
[facility]

                             Created on: 2015



ElizabethCrystal macario

External Reference #: [id number]

: 1965

Sex: Female



Demographics







                          Address                   [id number] [place] [address] [place]  [address]

 

                          Home Phone                [phone]

 

                          Preferred Language        Unknown

 

                          Marital Status            Unknown

 

                          Amish Affiliation     Unknown

 

                          Race                      Unknown

 

                          Ethnic Group              /Latin





Author







                          Author                    Jayne Kebede

 

                          [facility] [facility]              [employer]

 

                          Address                   Unknown

 

                          Phone                     Unavailable







Care Team Providers







                    Care Team Member Name    Role                Phone

 

                    Jayne Kebede CP                  Unavailable



                                                                



Allergies, Adverse Reactions, Alerts

          





                    Substance           Reaction            Event Type

 

                    N.K.D.A.            Info Not Available    Non Drug Allergy



                                                                    



Encounters

          





                    Encounter           Location            Date

 

                    [facility]                 [facility]    Dec 29, 2014

 

                          SS STUDY QUESTIONAIRE     [facility]

                                        2015

 

                    LAB RESULTS         [facility]    2015

 

                    Unknown             [facility]    2015



 

                          LEFT KNEE PAIN/ SWELLLING    [facility]

                                        Oct 29, 2014

 

                          Needs call back from Medical Staff    [facility]                             Oct 30, 2014

 

                    Needs referral      [facility]    2014

 

                          insomnia and stress, headaches     [facility]                              Dec 28, 2015

 

                    H A/ SORE THROAT    [facility]    2015

 

                    left eye            [facility]    May 22, 2015





                                                                                
                                                                                
                



Problems

          





             Problem Type    Condition    ICD-9 Code    Onset Dates    Condition Status

 

             Problem      Insomnia     307.41                    Active

 

             Problem      Anxiety      300.00                    Active

 

             Problem      Prediabetes    790.29                    Active

 

             Problem      Depression    F32.9                     Active

 

             Problem      Anxiety      F41.9                     Active

 

             Problem      Insomnia     G47.00                    Active

 

             Problem      Vitamin d deficiency    268.9                     Active

 

             Problem      Hyperlipidemia    272.4                     Active

 

             Problem      History of abnormal Pap smear    V13.29                    Active

 

             Problem      Positive MARY LOU (antinuclear antibody)    795.79                    Active

 

             Assessment    Anxiety      F41.9                     Active

 

             Problem      Spondyloarthropathy    721.90                    Active

 

             Problem      Depression    311                       Active

 

             Assessment    Depression    F32.9                     Active

 

             Problem      BMI 31.0-31.9,adult    V85.31                    Active

 

             Assessment    Insomnia     G47.00                    Active

 

             Problem      Obesity      278.00                    Active



                                                                                
                                                                                
                                                                                
     



Medications

          





        Medication    Code System    Code    Instructions    Start Date    End Date    Status    Dosage



 

          [facility]    [facility]    [address]-09    0.5 MG Orally QHS    2015              Active 

                                        1 tablet

 

             HydrOXYzine HCl    [facility]     [address]-90    50 mg Orally at hour of sleep    Dec 28,

 2015                                   Active              1 tablet as needed

 

        Folic Acid    [facility]    [address]-88    1 MG Orally Once a day                    Active    1 tablet



 

          Methotrexate    [facility]    [address]-36    2.5 MG Orally every wednesday                        Active

                                        5 capsules

 

          Venlafaxine HCl ER    [facility]    [address]-36    75 mg Orally Once a day                        Active

                                        1 capsule with food

 

        Norco    [facility]    [address]-72    5-325 MG Orally every 6 hrs                    Active    1 tablet

 as needed



                                                                                
                                                         



Social History

          





                    Social History Element    Qualifiers          Date Reported

 

                    Last Colonoscopy:    .  12/20/14         Dec 28, 2015

 

                    Ethnicity           .  Status , Is english your primary language? No [place]    Dec

 28, 2015

 

                    children            .  3                Dec 28, 2015

 

                    Flu Vaccine:        .  2015             Dec 28, 2015

 

                    Tobacco Use:        .  Are you a: never smoker    Dec 28, 2015

 

                    Marital Status:     .           Dec 28, 2015

 

                    Caffeine intake?    .  Status: Yes, What type: Coffee    Dec 28, 2015

 

                    Do you exercise?    .  Answer: Yes, Type: walking, biking    Dec 28, 2015

 

                    Do you drink alcohol?    .  Status: Yes, How often? Socially    Dec 28, 2015

 

                    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



                                                                                
                                                                                
                          



Vital Signs

          





                          Date/Time:                Dec 28, 2015

 

                          Weight                    160 lbs

 

                          Height                    61 in

 

                          Cardiac Monitoring Heart Rate    63 /min

 

                          Blood Pressure Diastolic    58 mm Hg

 

                          Blood Pressure Systolic    98 mm Hg



                                                                    



Summary Purpose

          [employer]Clinical[employer] Submission

## 2019-07-15 NOTE — XMS REPORT
[facility]

                             Created on: 10/25/2018



Crystal Elizabeth

External Reference #: [id number]

: 1965

Sex: Female



Demographics







                          Address                   [address] [place] [address]  [address]

 

                          Home Phone                [phone]

 

                          Preferred Language        Unknown

 

                          Marital Status            Unknown

 

                          Confucianist Affiliation     Unknown

 

                          Race                      Unknown

 

                          Ethnic Group              /Latin





Author







                          Author                    Nahid Crystal

 

                          Organization              [employer]

 

                          Address                   Unknown

 

                          Phone                     Unavailable







Care Team Providers







                    Care Team Member Name    Role                Phone

 

                    Nahid Crystal     CP                  Unavailable



                                                                



Allergies

          No Known Allergies                                                    
                                   



Problems

          





             Problem Type    Condition    Code         Onset Dates    Condition Status

 

             Problem      Insomnia     G47.00                    Active

 

             Problem      Vitamin D deficiency    E55.9                     Active

 

             Problem      Depression    F32.9                     Active

 

             Problem      Acquired hypothyroidism    E03.9                     Active

 

             Problem      Positive MARY LOU (antinuclear antibody)    R76.8                     Active

 

             Problem      BMI 34.0-34.9,adult    Z68.34                    Active

 

                    Problem             Recurrent major depressive disorder, remission status unspecified    F33.9

                                                    Active

 

             Problem      Uses Albanian as primary spoken language    Z78.9                     Active

 

             Problem      History of abnormal cervical Pap smear    Z87.898                   Active

 

             Problem      Prediabetes    R73.03                    Active

 

             Problem      Inflammatory arthritis    M19.90                    Active

 

             Problem      Lupus        M32.9                     Active

 

             Assessment    Insomnia     G47.00                    Active

 

             Problem      Anxiety      F41.9                     Active



                                                                                
                                                                                
                                                        



Medications

          





        Medication    Code System    Code    Instructions    Start Date    End Date    Status    Dosage



 

           Trazodone HCl    NDC        99171003241    50 mg Orally once every night    2018    

                          Active                    1 tablet



                                                                              



Results

          No Known Results                                                      
             



Summary Purpose

          [employer] Submission

## 2019-07-15 NOTE — XMS REPORT
[facility]

                             Created on: 2018



Glenn Crystal

External Reference #: [id number]

: 1965

Sex: Female



Demographics







                          Address                   [address] [place] [address]  [address]

 

                          Home Phone                [phone]

 

                          Preferred Language        Unknown

 

                          Marital Status            Unknown

 

                          Anabaptist Affiliation     Unknown

 

                          Race                      Unknown

 

                          Ethnic Group              /Latin





Author







                          Author                    Nahid Crystal

 

                          Organization              [employer]

 

                          Address                   Unknown

 

                          Phone                     Unavailable







Care Team Providers







                    Care Team Member Name    Role                Phone

 

                    Nahid Crystal     CP                  Unavailable



                                                                



Allergies

          No Known Allergies                                                    
                                   



Problems

          





             Problem Type    Condition    Code         Onset Dates    Condition Status

 

             Problem      Insomnia     G47.00                    Active

 

             Problem      Vitamin D deficiency    E55.9                     Active

 

             Problem      Depression    F32.9                     Active

 

             Problem      Positive MARY LOU (antinuclear antibody)    R76.8                     Active

 

             Problem      History of abnormal cervical Pap smear    Z87.898                   Active

 

             Problem      Acquired hypothyroidism    E03.9                     Active

 

                    Problem             Recurrent major depressive disorder, remission status unspecified    F33.9

                                                    Active

 

             Problem      Uses Divehi as primary spoken language    Z78.9                     Active

 

             Problem      Major depressive disorder, single episode, unspecified    F32.9                     Active



 

             Problem      Prediabetes    R73.03                    Active

 

             Assessment    Acquired hypothyroidism    E03.9                     Active

 

             Problem      Inflammatory arthritis    M19.90                    Active

 

             Assessment    Major depressive disorder, single episode, unspecified    F32.9                     Active



 

             Problem      Lupus        M32.9                     Active

 

             Assessment    Vitamin D deficiency    E55.9                     Active

 

             Problem      Anxiety      F41.9                     Active



                                                                                
                                                                                
                                                                            



Medications

          





        Medication    Code System    Code    Instructions    Start Date    End Date    Status    Dosage



 

        Synthroid    NDC     99968102640    50 MCG Orally Once a day    May 15, 2018            Active    

1 tablet on an empty stomach in the morning

 

             Vitamin D (Ergocalciferol)    NDC          52590980040    09576 UNIT Orally daily    May 15, 2018

                                        Active              1 capsule

 

             Citalopram Hydrobromide    NDC          92026491325    10 mg Orally Once a day    May 15, 2018

                                        Active              1 tablet



                                                                                
                 



Results

          No Known Results                                                      
             



Summary Purpose

          [employer] Submission

## 2019-07-15 NOTE — XMS REPORT
Continuity of Care Document

                             Created on: 07/15/2019



ADILSON GRIFFITH

External Reference #: [id number]

: 1965

Sex: Female



Demographics







                          Address                   [address] [place]  [address]

 

                          Home Phone                [phone]

 

                          Preferred Language        English

 

                          Marital Status            Unknown

 

                          Caodaism Affiliation     Unknown

 

                          Race                      Unknown

 

                          Ethnic Group              Unknown





Author







                          Author                    [employer]

 

                          Organization              [employer]

 

                          Address                   Unknown

 

                          Phone                     Unavailable







Care Team Providers







                    Care Team Member Name    Role                Phone

 

                    [employer]    Unavailable         Unavailable



                                    



Problems

                    





                    Problem                            Status                            Onset Date     

                          Classification                            Date Reported       

                          Comments                            Source                    

 

                    Rash                            Active                                              

                    Problem                            2018                                

                                        [facility]                    

 

                    Polyarthritis                            Active                                     

                          Problem                            2018                     

                                                      [facility]                    

 

                          Long-term use of high-risk medication                            Active           

                                                Problem                            2018

                                                        [facility]               

     

 

                          De Quervain's tenosynovitis                            Active                     

                                                Problem                            2018       

                                                      [facility]                    

 

                    Fibromyalgia                            Active                                      

                          Problem                            2018                      

                                                      [facility]                    

 

                    Raised antibody titer                            Active                             

                           Problem                            2018             

                                                      [facility]                    

 

                          Screening for osteoporosis                            Active                      

                                                Problem                            2018        

                                                      [facility]                    

 

                    Insomnia                            Active                                          

                    Problem                            2019                            

                                        [facility]                  

  

 

                    Vitamin D deficiency                            Active                              

                          Problem                            2019              

                                                      [facility]      

              

 

                    Depression                            Active                                        

                          Problem                            2019                        

                                                      [facility]                

    

 

                          Acquired hypothyroidism                            Active                         

                                                Problem                            2019           

                                                      [facility]   

                 

 

                    Positive MARY LOU                            Active                                      

                          Problem                            2019                      

                                                      [facility]              

      

 

                    BMI 34.0-34.9,adult                            Active                               

                          Problem                            2019               

                                                      [facility]       

             

 

                                        Recurrent major depressive disorder, remission status unspecified               

                    Active                                                        Problem     

                          2019                                                    

                                        [facility]                    

 

                          Uses Serbian as primary spoken language                            Active         

                                                Problem                            2019

                                                        [facility]                    

 

                          History of abnormal cervical Pap smear                            Active          

                                                Problem                            2019

                                                        [facility]                    

 

                    Prediabetes                            Active                                       

                          Problem                            2019                       

                                                      [facility]               

     

 

                    Inflammatory arthritis                            Active                            

                            Problem                            2019            

                                                      [facility]                    

 

                    Lupus                            Active                                             

                    Problem                            2019                               

                                        [facility]      

              

 

                    Anxiety                            Active                                           

                    Problem                            2019                             

                                        [facility]                   

 

 

                    Pain in right leg                            Active                                 

                          Diagnosis                            2018               

                                                      [facility]       

             

 

                    Pain of left leg                            Active                                  

                          Diagnosis                            2018                

                                                      [facility]        

            

 

                    Other fatigue                            Active                                     

                          Diagnosis                            2018                   

                                                      [facility]           

         

 

                          Major depressive disorder, single episode, unspecified                            

Active                                                        Problem                

                    2018                                                        [facility]                    

 

                          Screening for breast cancer                            Active                     

                                                Diagnosis                            2018     

                                                      [facility]

                    

 

                                        Encntr for general adult medical exam w/o abnormal findings                     

                    Active                                                        Diagnosis         

                    2018                                                        [facility]                    

 

                          Screening for colon cancer                            Active                      

                                                Diagnosis                            2018      

                                                      [facility]

                    

 

                          Obstructive sleep apnea                            Active                         

                                                Problem                            2019           

                                                      [facility]   

                 

 

                          Breast cancer screening                            Active                         

                                                Diagnosis                            2018         

                                                      [facility] 

                   

 

                    BMI 31.0-31.9,adult                            Active                               

                          Problem                            2016               

                                                      [facility]       

             

 

                    Depression                            Active                                        

                          Problem                            2016                        

                                                      [facility]                

    

 

                    Obesity                            Active                                           

                    Problem                            2016                             

                                        [facility]                   

 

 

                    Abnormal pap                            Active                                      

                          Diagnosis                            2015                    

                                                      [facility]            

        

 

                                        Encounter for screening mammogram for malignant neoplasm of breast              

                    Active                                                        Diagnosis  

                          2015                                                 

                                        [facility]                    

 

                    Insomnia                            Active                                          

                    Problem                            2016                            

                                        [facility]                  

  

 

                          Special screening for malignant neoplasms, colon                            Active

                                                        Diagnosis                    

                    2015                                                        [facility]                    

 

                    Hyperlipidemia                            Active                                    

                          Problem                            2016                    

                                                      [facility]            

        

 

                    Positive MARY LOU                            Active                                      

                          Problem                            2016                      

                                                      [facility]              

      

 

                          Excessive daytime sleepiness                            Active                    

                                                Diagnosis                            2015    

                                                      [facility]

                    

 

                    Prediabetes                            Active                                       

                          Problem                            2016                       

                                                      [facility]               

     

 

                          History of abnormal Pap smear                            Active                   

                                                Problem                            2016     

                                                      [facility]

                    

 

                    Anxiety                            Active                                           

                    Problem                            2016                             

                                        [facility]                   

 

 

                    Vitamin d deficiency                            Active                              

                          Problem                            2016              

                                                      [facility]      

              

 

                    Depression screening                            Active                              

                          Diagnosis                            2014            

                                                      [facility]    

                

 

                    Toe pain, left                            Active                                    

                          Diagnosis                            2014                  

                                                      [facility]          

          

 

                    Baker's Cyst                            Active                                      

                          Diagnosis                            2014                    

                                                      [facility]            

        

 

                    Left knee pain                            Active                                    

                          Diagnosis                            2014                  

                                                      [facility]          

          

 

                          Upper respiratory infection                            Active                     

                                                Diagnosis                            2015     

                                                      [facility]

                    

 

                    Sore throat                            Active                                       

                          Diagnosis                            2015                     

                                                      [facility]             

       

 

                          Bacterial conjunctivitis of left eye                            Active            

                                                Diagnosis                            2015

                                                        [facility]                    

 

                    Spondyloarthropathy                            Active                               

                          Problem                            2016               

                                                      [facility]       

             

 

                    Menopause                            Active                                         

                          Problem                            2016                         

                                                      [facility]                 

   

 

                    Vertigo                            Active                                           

                          Diagnosis                            2016                         

                                                      [facility]                 

   

 

                    Dizziness                            Active                                         

                          Diagnosis                            2016                       

                                                      [facility]               

     

 

                    Menorrhagia                            Active                                       

                          Diagnosis                            2016                     

                                                      [facility]             

       

 

                          Encounter for screening mammogram for breast cancer                            Active

                                                        Diagnosis                    

                    2015                                                        [facility]                    

 

                                        Encounter for screening for malignant neoplasm of colon                         

                    Active                                                        Diagnosis             

                    2015                                                        [facility]                    

 

                    PTSD                            Active                                              

                                                2019                                       

                                        [facility]                    

 

                                        Moderate episode of recurrent major depressive disorder                         

                    Active                                                                              

                    2019                                                        [facility]  

                  

 

                          MDD , recurrent episode, moderate                            Active               

                                                                            2019        

                                                      [facility]                    

 

                                        Systemic lupus erythematosus, unspecified SLE type, unspecified organ involvement

 status                            Active                                            

                                                2019                                     

                                        [facility]                    

 

                    Inflammatory arthritis                            Active                            

                                                        2019                   

                                                      [facility]                    

 

                    Fatty liver                            Active                                       

                                                2019                                

                                        [facility]                    

 

                          High risk medication use                            Active                        

                                                                            2019                 

                                                      [facility]                    

 

                          Long-term use of Plaquenil                            Active                      

                                                                            2019               

                                                      [facility]                    

 

                          Axillary lymphadenopathy                            Active                        

                                                                            2019                 

                                                      [facility]                    

 

                    BMI 33.0-33.9,adult                            Active                               

                                                      2019                      

                                                      [facility]                    

 

                    Medication refill                            Active                                 

                                                      2019                        

                                                      [facility]                    

 

                    Urticaria, unspecified                            Active                            

                                                        2019                   

                                                      [facility]                    

 

                          Bilateral calcific tendinitis of shoulders                            Active      

                                                                              2019

                                                        [facility]                

    

 

                    Abnormal chest x-ray                            Active                              

                                                      2019                     

                                                      [facility]                    

 

                    DDD , lumbar                            Active                                      

                                                2019                               

                                        Located within [facility]                    

 

                    Abnormal LFTs                            Active                                     

                                                2019                              

                                        Located within [facility]                    

 

                          Sjogren's syndrome with keratoconjunctivitis sicca                            Active

                                                                                    2019

                                                        Located within [facility]                

    

 

                          Adjustment disorder with depressed mood                            Active         

                                                                            2019  

                                                      Located within [facility]                  

  

 

                    Dietary counseling                            Active                                

                                                      2019                       

                                                      Located within [facility]                    

 

                    Exercise counseling                            Active                               

                                                      2019                      

                                                      Located within [facility]                    

 

                    BMI 34.0-34.9,adult                            Active                               

                                                      2019                      

                                                      Located within [facility]                    

 

                          Arthralgia of shoulder, unspecified laterality                            Active  

                                                                                  2019

                                                        Located within [facility]                

    

 

                          Low back pain at multiple sites                            Active                 

                                                                            2019          

                                                      Located within [facility]                    

 

                          Screening for tuberculosis                            Active                      

                                                                            2019               

                                                      Located within [facility]                    

 

                          Insomnia, unspecified type                            Active                      

                                                                            2019               

                                                      Located within [facility]                    

 

                          Patient left without being seen                            Active                 

                                                                            2019          

                                                      Located within [facility]                    



                                                                                
                                                                                
                                                                                
                                                                                
                                                                                
                                                                                
                                                                                
                                                                                
                                                                                
                                                                                
                                                                                
                                                                                
                                                                                
                                                                                
                                                                                
                                                                                
                                                                                
       



Medications

                    





                    Medication                            Details                            Route      

                          Status                            Patient Instructions         

                          Ordering Provider                            Order Date           

                                        Source                    

 

                          FLUoxetine (PROZAC) 20 mg capsule                            Take 1 capsule by mouth

 daily for 90 days.                            Oral                            Active

                                                                                    2019

                                        Located within [facility]                    

 

                          hydrOXYzine (ATARAX) 50 mg tablet                            Take 1 tablet by mouth

 at bedtime nightly for 90 days.                            Oral                   

                    Active                                                                        

                          2019                            Located within [facility]                    

 

                          citalopram (CELEXA) 40 mg tablet                            Take 1 tablet by mouth

 daily for 90 days.                            Oral                            No Longer

 Active                                                                              

                          2019                            Located within [facility]                    

 

                          traZODone (DESYREL) 50 mg tablet                            Take 50 mg by mouth at

 bedtime nightly.                            Oral                            No Longer

 Active                                                                              

                          2019                            Located within [facility]                    

 

                          QUEtiapine (SEROQUEL) 50 mg tablet                            Take 1 tablet by mouth

 at bedtime nightly for 90 days.                            Oral                   

                    No Longer Active                                                              

                          2019                            Located within [facility]            

        

 

                          Citalopram 20 Mg Tablet                            Take 20 mg by mouth daily.     

                          Oral                            No Longer Active              

                                                                            2018       

                                        Located within [facility]                    

 

                          Citalopram 40 Mg Tablet Celexa 40 Mg Tablet                            Take 1 tablet

 by mouth daily for 60 days.                            Oral                       

                    Active                                                                            

                          2018                            Located within [facility]                    

 

                          Trazodone 100 Mg Tablet                            Take 2 tablets by mouth at bedtime

 nightly for 60 days.                            Oral                            Active

                                                                                    2018

                                        Located within [facility]                    

 

                          citalopram (CELEXA) 20 mg tablet                            Take 20 mg by mouth daily.

                            Oral                            No Longer Active         

                                                                            2018  

                                        Located within [facility]                    

 

                          citalopram (CELEXA) 40 mg tablet                            Take 1 tablet by mouth

 daily for 60 days.                            Oral                            No Longer

 Active                                                                              

                          2018                            Located within [facility]                    

 

                          traZODone (DESYREL) 100 mg tablet                            Take 2 tablets by mouth

 at bedtime nightly for 60 days.                            Oral                   

                    No Longer Active                                                              

                          2018                            Located within [facility]            

        

 

                          Methotrexate Sodium 2.5 Mg Tablet                            Take 1 tablet by mouth

 weekly.                            Oral                            Active           

                                                                            11/15/2018    

                                        Located within [facility]                    

 

                          methotrexate (RHEUMATREX) 2.5 mg tablet                            Take 1 tablet by

 mouth weekly.                            Oral                            No Longer Active

                                                                                    11/15/2018

                                        Located within [facility]                    

 

                          Hydroxychloroquine 200 Mg Tablet                            Take 1 tablet by mouth

 daily.                            Oral                            Active            

                                                                            10/22/2018     

                                        Located within [facility]                    

 

                          hydroxychloroquine (PLAQUENIL) 200 mg tablet                            Take 1 tablet

 by mouth daily.                            Oral                            Active   

                                                                                 10/22/2018

                                        Located within [facility]                    

 

                          Metronidazole 500 Mg Tablet                            take 1 tablet (500 mg) by oral

 route every 8 hours                            Oral                            No Longer

 Active                                                                              

                          2018                            Located within [facility]                    

 

                          Ciprofloxacin 500 Mg Tablet                            take 1 tablet (500 mg) by oral

 route every 12 hours                            Oral                            No Longer

 Active                                                                              

                          2018                            Located within [facility]                    

 

                          Methotrexate Sodium 2.5 Mg Tablet                            Take 6 tablets by mouth

 weekly.                            Oral                            No Longer Active 

                                                                                   2018

                                        Located within [facility]                    

 

                          Folic Acid 1 Mg Tablet                            Take 1 tablet by mouth daily.   

                          Oral                            Active                      

                                                                            2018               

                                        Located within [facility]                    

 

                          Cyclobenzaprine 5 Mg Tablet                            Take 1 tablet by mouth nightly

 at bedtime as needed for Muscle Spasms.                            Oral           

                    Active                                                                

                          2018                            Located within [facility]              

      

 

                          Naproxen 250 Mg Tablet                            Take 1 tablet by mouth 2 times daily

 (with meals).                            Oral                            Active     

                                                                               2018

                                        Located within [facility]                    

 

                          METRONIDAZOLE 500 MG TAB                            take 1 tablet (500 mg) by oral

 route every 8 hours                            Oral                            No Longer

 Active                                                                              

                          2018                            Located within [facility]                    

 

                          CIPROFLOXACIN 500 MG TAB                            take 1 tablet (500 mg) by oral

 route every 12 hours                            Oral                            No Longer

 Active                                                                              

                          2018                            Located within [facility]                    

 

                          methotrexate (RHEUMATREX) 2.5 mg tablet                            Take 6 tablets 

by mouth weekly.                            Oral                            No Longer

 Active                                                                              

                          2018                            Located within [facility]                    

 

                          folic acid (FOLVITE) 1 mg tablet                            Take 1 tablet by mouth

 daily.                            Oral                            Active            

                                                                            2018     

                                        Located within [facility]                    

 

                          Cyclobenzaprine (FLEXERIL) 5 mg tablet                            Take 1 tablet by

 mouth nightly at bedtime as needed for Muscle Spasms.                            Oral

                            Active                                                   

                                                2018                            Located within [facility]   

                 

 

                          naproxen (NAPROSYN) 250 mg tablet                            Take 1 tablet by mouth

 2 times daily (with meals).                            Oral                       

                    Active                                                                            

                          2018                            Located within [facility]                    

 

                          MetFORMIN HCl ER                            1 tablet with evening meal            

                    Orally                            Active                            750

 MG Orally qd LAST REFILL, NEEDS TO BE SEEN                            [place]    

                          2018                            [facility]                    

 

                          Amitriptyline 25 Mg Tablet                            Take 2 tablets by mouth at bedtime

 nightly.                            Oral                            No Longer Active

                                                                                    2018

                                        Located within [facility]                    

 

                          amitriptyline (ELAVIL) 25 mg tablet                            Take 2 tablets by mouth

 at bedtime nightly.                            Oral                            No Longer

 Active                                                                              

                          2018                            Located within [facility]                    

 

                    Levothyroxine 50 McG Tablet                                                         

                           Active                                                    

                                                2018                            Located within [facility]    

                

 

                          levothyroxine (SYNTHROID) 50 mcg tablet                                           

                                                Active                                        

                                                2018                            Located within [facility]                    

 

                          Vitamin D (Ergocalciferol)                            1 capsule                   

                    Orally                            Active                            37411 UNIT

 Orally once per week                            [place]                            

05/15/2018                              [facility]        

            

 

                          Vitamin D (Ergocalciferol)                            1 capsule                   

                    Orally                            Active                            02472 UNIT

 Orally once per week                            [place]                            

05/15/2018                              [facility]        

            

 

                          Synthroid                            1 tablet on an empty stomach in the morning  

                          Orally                            Active                   

                          50 MCG Orally qd LAST REFILL, NEEDS BLOOD WORK                            [place]

                            05/15/2018                            [facility]                    

 

                          Citalopram Hydrobromide                            1 tablet                       

                    Orally                            Active                            10 mg Orally Once

 a day                            [place]                            05/15/2018     

                                        [facility]                    

 

                          Citalopram Hydrobromide                            1 tablet                       

                    Orally                            Active                            20 MG Orally Once

 a day                            [place]                            05/15/2018      

                                        [facility]                    

 

                    Trazodone HCl                            2 tablets                            Orally

                            Active                            50 mg Orally Once a day

                            [place]                            2018           

                                        [facility]                    

 

                    Trazodone HCl                            1 tablet                            Orally 

                           Active                            50 mg Orally once every 

night                            [place]                            2018      

                                        [facility]                    

 

                          HydrOXYzine HCl                            1 tablet as needed                     

                    Orally                            Active                            50 mg Orally

 at hour of sleep                            [place]                            2015

                                        [facility]                  

  

 

                          HydrOXYzine HCl                            1 tablet as needed                     

                    Orally                            Active                            50 mg Orally

 at hour of sleep                            [facility]                            2015

                                        [facility]                  

  

 

                    Ceftin                            1 tablet                            Orally        

                          Active                            500 mg Orally Twice a day      

                          [facility]                            2015                

                                        [facility]                    

 

                    Bromfed DM                            10 ml as needed                            Orally

                            Active                            30-2-10 MG/5ML Orally every

 6 hrs                            [facility]                            2015    

                                        [facility]                    

 

                          Polytrim                            1 drop into affected eye                      

                    Ophthalmic                            Active                            55801-1.1

 UNIT/ML-% Ophthalmic Six times a day                            [facility]         

                          2015                            [facility]                    

 

                    Alprazolam                            1 tablet                            Orally    

                          Active                            0.5 MG Orally QHS          

                          [facility]                            2015                        

                                        [facility]                    

 

                          Vitamin D (Ergocalciferol)                            1 capsule                   

                    Orally                            Active                            71530 UNIT

 Orally once per week                            [facility]                         

                          2015                            [facility]       

             

 

                    Medrol (Young)                            as directed                            Orally

                            Active                            4 mg Orally as directed

                            ~~[facility]                            10/30/2014          

                                        [facility]                    

 

                    Celebrex                            1 capsule                            Orally     

                          Active                            200 MG Orally Once a day    

                          ~~[facility]                            10/29/2014              

                                        [facility]                    

 

                          Prevacid 30 Mg Capsule,Delayed Release                            take 1 capsule (30

 mg) by oral route once daily before a meal                            Oral        

                          No Longer Active                                                 

                                                2010                            Located within [facility] 

                   

 

                          PREVACID 30 MG CAP                            take 1 capsule (30 mg) by oral route

 once daily before a meal                            Oral                            

No Longer Active                                                                     

                          2010                            Located within [facility]                   

 

 

                          Venlafaxine HCl ER                            1 capsule with food                 

                    Orally                            Active                            75 mg Orally

 Once a day                            [facility]                    

 

                          Mirtazapine                            1 tablet before bedtime in the evening     

                       NA                            Active                            

45                            [facility]                    

 

                          Melatonin                            1 capsule at bedtime as needed with food     

                          Orally                            Active                      

                    3 MG Orally                            [facility]                    

 

                    Amitriptyline HCl                            2 tablet                            Orally

                            Active                            25 MG Orally Once a day

                            [facility]                    

 

                          Citalopram Hydrobromide                            1 tablet                       

                    orally                            Active                            20 mg orally Once

 a day                            [facility]                    

 

                          Citalopram Hydrobromide                            TAKE 1 TABLET BY MOUTH EVERY DAY

                            NA                            Active                     

                    20 MG                            [facility]                    

 

                          Venlafaxine HCl ER                            1 capsule with food                 

                    Orally                            Active                            75 mg Orally

 Once a day                            [facility]                    

 

                    IBU                            1 tablet                            Orally           

                          No Longer Active                            800 MG Orally Three times

 a day                            [facility]                    

 

                    Folic Acid                            1 tablet                            Orally    

                          Active                            1 MG Orally Once a day     

                       [facility]                    

 

                    Methotrexate                            5 capsules                            Orally

                            Active                            2.5 MG Orally every wednesday

                            [facility]                    

 

                    Norco                            1 tablet as needed                            Orally

                            Active                            5-325 MG Orally every 6

 hrs                            [facility]                    

 

                          Melatonin                            1 capsule at bedtime as needed with food     

                          Orally                            Active                      

                    3 MG Orally                            [facility]                    

 

                          Trazodone 50 Mg Tablet                            Take 50 mg by mouth at bedtime nightly.

                            Oral                            Active                   

                                                                                                  

                                        Located within [facility]                    



                                                                                
                                                                                
                                                                                
                                                                                
                                                                                
                                                                                
                                                                                
                                                                                
                                                                                
                                                                                
                                                                                
                                                                                
                                                                                
                       



Allergies, Adverse Reactions, Alerts

                    





                    Substance                            Category                            Reaction   

                          Severity                            Reaction type           

                          Status                            Date Reported                     

                          Comments                            Source                    

 

                    N.K.D.A.                            Adverse Reaction                            Info

 Not Available                                                        Adverse Reaction

                            Active                            2018             

                                                      [facility]     

               



                                                        



Immunizations

        





                                        No Data Provided for This Section



                                    



Results







                    Order Name                            Results                            Value      

                          Reference Range                            Date                

                          Interpretation                            Comments                       

                                        Source                    

 

                    ANTI DSDNA BY BONY                            Anti dsDNA          Negative           

                          NEG Titer                            2019                     

                                                                            Located within [facility]           

         

 

                SED RATE                            Sed Rate        43                            <30 mm/Hr

                            01/15/2019                                               

                                                      Located within [facility]                    

 

                    SED RATE                            Lab Interpretation    Abnormal                  

                                                01/15/2019                                       

                                                      Located within [facility]                    

 

                    C-REACTIVE PROT                            C-Reactive Prot     0.8                   

                    <10                            01/15/2019                                     

                                                      Located within [facility]                    

 

                    COMPLEMENT C3                            Complement C3       183.1                     

                    87 - 200                            01/15/2019                                  

                                                      Located within [facility]                    

 

                    COMPLEMENT C4                            Complement C4       47.9                      

                    19 - 52                            01/15/2019                                    

                                                      Located within [facility]                    

 

                          LIVER PROFILE                            <td ID="[id number]">Protein,

 Total, Serum</td><td><span style="flagData">8.6</span&gt;<span 
style="flagData"> (H)</span></td><td>6.0 - 8.3 g/dL</td><td> MAIN-STATION 
1</td&gt;<td ID="[id number]"/>    8.6                            6

 - 8.3                            01/15/2019                                         

                                                      [facility]                    

 

                          LIVER PROFILE                            <td ID="[id number]">Albumin</td><td>4.4</td><td>3.7

 - 5.3 g/dL</td><td>BT MAIN-STATION 1</td><td 
ID="[id number]"/>    4.4                            3.7 - 5.3     

                          01/15/2019                                                    

                                                      [facility]                    

 

                          LIVER PROFILE                            <td ID="[id number]">Bilirubin,

 Total</td><td>0.3</td><td>0.2 - 1.2 mg/dL</td><td>BT MAIN-STATION 1</td><td 
ID="[id number]"/>    0.3                            0.2 - 1.2     

                          01/15/2019                                                    

                                                      [facility]                    

 

                          LIVER PROFILE                            <td ID="[id number]">Alkaline

 Phosphatase, S</td><td>104</td><td>34 - 104 U/L</td>&lt;td>BT MAIN-STATION 
1</td><td ID="[id number]"/>    104                            34 -

 104                            01/15/2019                                           

                                                      [facility]                    

 

                          LIVER PROFILE                            <td ID="[id number]">AST (SGOT)</td><td>21</td><td>13

 - 39 U/L</td><td>BT MAIN-STATION 1</td><td ID="[id number]"/>
                    21                            13 - 39                            01/15/2019         

                                                                            [facility]

                    

 

                          LIVER PROFILE                            <td ID="[id number]">ALT</td><td>24</td><td>7

 - 52 U/L</td><td>BT MAIN-STATION 1</td><td ID="[id number]"/>
                    24                            7 - 52                            01/15/2019          

                                                                            Located within [facility]

                    

 

                          LIVER PROFILE                            <td ID="[id number]">D Bilirubin</td><td>0.1</td><td>0.0

 - 0.2 mg/dL</td><td&amp;gt;BT MAIN-STATION 1</td><td 
ID="[id number]"/>    0.1                            0 - 0.2       

                     01/15/2019                                                        

                                        Located within [facility]                    

 

                    LIVER PROFILE                            Lab Interpretation    Abnormal             

                                                01/15/2019                                  

                                                      Located within [facility]                    

 

                    T PROT/CREA RATIO,UR                            Creatinine, Urine    96.5           

                          20 - 320                            2019                      

                                                                            Located within [facility]            

        

 

                    T PROT/CREA RATIO,UR                            T Prot, Ur          0.25                  

                                                2019                                       

                                                      Located within [facility]                    

 

                    T PROT/CREA RATIO,UR                            T Prot/Crea Ratio,Ur    0.26        

                          0.0 - 0.5                            2019                  

                                                                            Located within [facility]        

            

 

                    SJOGREN'S AB                            Anti SS/A           >8.0

Reference range: 0.0 to 0.9

Unit: AI

                                                        2018                   

                                                                            Located within [facility]         

           

 

                    SJOGREN'S AB                            Anti SS/B           >8.0

Reference range: 0.0 to 0.9

Unit: AI

                                                        2018                   

                                                                            Located within [facility]         

           

 

                    ANTI DSDNA BY CRITHIDIA                            Anti dsDNA          Negative           

                          NEG Titer                            2018                     

                                                                            Located within [facility]           

         

 

                    C-REACTIVE PROT                            C-Reactive Prot     0.7                   

                    <10                            2018                                     

                                                      Located within [facility]                    

 

                    COMPLEMENT C3                            Complement C3       175.4                     

                    87 - 200                            2018                                  

                                                      Located within [facility]                    

 

                    COMPLEMENT C4                            Complement C4       41.0                      

                    19 - 52                            2018                                    

                                                      Located within [facility]                    

 

                RA FACTOR                            RA Factor       12                            <14 IU/mL

                            2018                                               

                                                      Located within [facility]                    

 

                          CBC/DIFF                            <td ID="[id number]">WBC</td><td>7.2</td><td>4.5

 - 11.0 K/uL</td><td>BT MAIN-STATION 2</td><td 
ID="[id number]"/>    7.2                            4.5 - 11      

                          2018                                                     

                                                      Located within [facility]                    

 

                          CBC/DIFF                            <td ID="Ejzinb906737596Qair1Eqta">RBC</td><td>4.43</td><td>4.20

 - 5.40 M/uL</td><td>BT MAIN-STATION 2</td><td 
ID="[id number]"/>    4.43                            4.20 - 5.40  

                          2018                                                 

                                                      Located within [facility]                    

 

                          CBC/DIFF                            <td ID="[id number]">Hemoglobin</td><td>13.2</td><td>12.0

 - 16.0 g/dL</td><td&amp;gt;BT MAIN-STATION 2</td><td 
ID="[id number]"/>    13.2                            12 - 16      

                          2018                                                     

                                                      Located within [facility]                    

 

                          CBC/DIFF                            <td ID="[id number]">Hematocrit</td><td>39.4</td><td>37.0

 - 47.0 %</td><td&gt;BT MAIN-STATION 2</td><td 
ID="[id number]"/>    39.4                            37 - 47      

                          2018                                                     

                                                      Located within [facility]                    

 

                          CBC/DIFF                            <td ID="Nparor697065202Blbe0Qvcs">MCV</td><td>89</td><td>82

 - 92 fL</td><td>BT MAIN-STATION 2</td><td ID="[id number]"/>    

89                            82 - 92                            2018          

                                                                            Located within [facility]

                    

 

                          CBC/DIFF                            <td ID="[id number]">MCH</td><td>29.8</td><td>27.0

 - 32.0 pg</td><td>BT MAIN-STATION 2</td><td 
ID="[id number]"/>    29.8                            27 - 32      

                          2018                                                     

                                                      Located within [facility]                    

 

                          CBC/DIFF                            <td ID="[id number]">MCHC</td><td>33.5</td><td>32.0

 - 36.0 g/dL</td><td>BT MAIN-STATION 2</td><td 
ID="[id number]"/>    33.5                            32 - 36      

                          2018                                                     

                                                      Located within [facility]                    

 

                          CBC/DIFF                            <td ID="[id number]">RDW</td><td>45.7</td><td>36.4

 - 46.3 fL</td><td>BT MAIN-STATION 2</td><td 
ID="[id number]"/>    45.7                            36.4 - 46.3  

                          2018                                                 

                                                      Located within [facility]                    

 

                          CBC/DIFF                            <td ID="[id number]">Platelets</td><td>244</td><td>150

 - 400 K/uL</td><td&gt;BT MAIN-STATION 2</td><td 
ID="[id number]"/>    244                            150 - 400     

                          2018                                                    

                                                      Located within [facility]                    

 

                          CBC/DIFF                            <td ID="[id number]">Mean Platelet

 Volume</td><td>11.6</td><td>9.4 - 12.4 fL</td>&lt;td>BT MAIN-STATION 2</td><td 
ID="[id number]"/>    11.6                            9.4 - 12.4  

                          2018                                                 

                                                      Located within [facility]                    

 

                CBC/DIFF                            Percent NRBC    0.0                               

                          2018                                                  

                                                      Located within [facility]                    

 

                CBC/DIFF                            Absolute NRBC    0.00                             

                           2018                                                

                                                      Located within [facility]                    

 

                          CBC/DIFF                            <td ID="[id number]">Neutrophils</td><td>70.0</td><td>34.0

 - 70.0 %</td><td&amp;gt;BT MAIN-STATION 2</td><td 
ID="Veokly602566721Tdcg59Bwehssdvy"/>    70.0                            34 - 70     

                          2018                                                    

                                                      Located within [facility]                    

 

                          CBC/DIFF                            <td ID="Hmhnah620271070Kkga45Uoyi">Lymphs</td><td>22.7</td><td>20.0

 - 50.0 %</td><td>BT MAIN-STATION 2</td><td 
ID="[id number]"/>    22.7                            20 - 50     

                          2018                                                    

                                                      Located within [facility]                    

 

                          CBC/DIFF                            <td ID="[id number]">Monocytes</td><td>5.3</td><td>5.0

 - 12.0 %</td><td>BT MAIN-STATION 2</td><td 
ID="[id number]"/>    5.3                            5 - 12       

                     2018                                                        

                                        Located within [facility]                    

 

                          CBC/DIFF                            <td ID="[id number]">Eos</td><td>1.5</td><td>0.7

 - 5.0 %</td><td>BT MAIN-STATION 2</td><td ID="[id number]"/>
                    1.5                            0.7 - 5                            2018        

                                                                            Located within [facility]

                    

 

                          CBC/DIFF                            <td ID="[id number]">Basos</td><td>0.4</td><td>0.1

 - 1.2 %</td><td>BT MAIN-STATION 2</td><td ID="[id number]"/>
                    0.4                            0.1 - 1.2                            2018      

                                                                              Located within [facility]

                    

 

                    CBC/DIFF                            Immature Granulocytes    0.1                    

                    0.0 - 0.5                            2018                                

                                                      Located within [facility]                    



 

                    CBC/DIFF                            Neutrophils (Absolute)    5.05                  

                    1.56 - 6.13                            2018                            

                                                        Located within [facility]                

    

 

                    CBC/DIFF                            Lymphs (Absolute)    1.64                       

                    1.18 - 3.74                            2018                                 

                                                      Located within [facility]                    

 

                    CBC/DIFF                            Monocytes(Absolute)    0.38                     

                    0.24 - 0.36                            2018                               

                                                      Located within [facility]                   

 

 

                CBC/DIFF                            Eos (Absolute)    0.11                            

0.04 - 0.36                            2018                                    

                                                      Located within [facility]                    

 

                    CBC/DIFF                            Baso (Absolute)     0.03                         

                    0.01 - 0.08                            2018                                   

                                                      Located within [facility]                    

 

                    CBC/DIFF                            Immature Grans (Abs)    0.01                    

                    0 - 0.03                            2018                                 

                                                      Located within [facility]                    

 

                    CBC/DIFF                            Lab Interpretation    Abnormal                  

                                                2018                                       

                                                      Located within [facility]                    

 

                LIVER PROFILE                            T Protein       8.2                            6

 - 8.3                            2018                                         

                                                      Located within [facility]                    

 

                LIVER PROFILE                            Albumin         4.3                            3.7

 - 5.3                            2018                                         

                                                      Located within [facility]                    

 

                    LIVER PROFILE                            T Bilirubin         0.5                         

                    0.2 - 1.2                            2018                                     

                                                      Located within [facility]                    

 

                LIVER PROFILE                            Alk Phos        111                            34

 - 104                            2018                                         

                                                      Located within [facility]                    

 

                LIVER PROFILE                            AST             27                            13 - 39 

                           2018                                                

                                                      Located within [facility]                    

 

                LIVER PROFILE                            ALT             26                            7 - 52  

                          2018                                                 

                                                      Located within [facility]                    

 

                    LIVER PROFILE                            D Bilirubin         0.1                         

                    0 - 0.2                            2018                                       

                                                      Located within [facility]                    

 

                    LIVER PROFILE                            Lab Interpretation    Abnormal             

                                                2018                                  

                                                      Located within [facility]                    

 

                          UREA NITROGEN/CREA                            <td ID="[id number]">BUN</td><td>18</td><td>7

 - 25 mg/dL</td><td>BT MAIN-STATION 1</td><td 
ID="[id number]"/>    18                            7 - 25         

                    2018                                                          

                                        Located within [facility]                    

 

                          UREA NITROGEN/CREA                            <td ID="[id number]">Creatinine</td><td>0.60</td><td>0.6

 - 1.2 mg/dL</td><td&amp;gt;BT MAIN-STATION 1</td><td 
ID="[id number]"/>    0.60                            0.6 - 1.2    

                          2018                                                   

                                                      Located within [facility]                    

 

                    UREA NITROGEN/CREA                            GFR, Estimated      >60                 

                          mL/min/1.73 m2                            2018                      

                                                                            Located within [facility]            

        

 

                    UREA NITROGEN/CREA                            eGFR If Africn Am    >60              

                          mL/min/1.73 m2                            2018                   

                                                                            Located within [facility]         

           

 

                    CCP IGG ABS                            CCP Abs IgG/IgA     9

Reference range: 0 to 19

Unit: units

                                        (note)

 Negative 59

                                                        2018                   

                                                                            Located within [facility]         

           

 

                MARY LOU                            MARY LOU Screen      Positive                            NEG  

                          2018                                                 

                                                      Located within [facility]                    

 

                    MARY LOU                            MARY LOU Pattern         SSA/Ro present, not titered           

                          Pattern                            2018                       

                                                                            Located within [facility]             

       

 

                    MARY LOU                            Lab Interpretation    Abnormal                       

                                                2018                                            

                                                      Located within [facility]                    

 

                    VIT D, 25-HYDROXY                            Vit D, 25-Hydroxy    34.0              

                          30 - 100                            2018                         

                                                      Vitamin D deficiency has been defined by the [place]

 of Medicine and<br/> Endocrine Society guideline as a level of serum 25-OH 
Vitamin D less than 20<br/> ng/mL. The Endocrine Society further defines Vitamin
D insufficiency as a<br/> level between 21 and 29 ng/mL and sufficiency as a 
level between 30 and 100<br/> ng/mL.<br/> <br/>                            Located within [facility]

                    

 

                          HEPATITIS PANEL                            <td ID="[id number]">HCV IgG</td><td>Negative</td><td>NEG</td><td>BT

 MAIN-STATION 3</td><td ID="[id number]"/>    Negative           

                    NEG                            2018                             

                                                       Located within [facility]                 

   

 

                          HEPATITIS PANEL                            <td ID="[id number]">HBsAg</td><td>Negative</td><td>NEG</td><td>BT

 MAIN-STATION 3</td><td ID="[id number]"/>    Negative           

                    NEG                            2018                             

                                                       Located within [facility]                 

   

 

                          HEPATITIS PANEL                            <td ID="[id number]">HAV, 

IgM</td><td>Negative</td><td>NEG</td><td>BT MAIN-STATION 3</td><td 
ID="[id number]"/>    Negative                            NEG      

                          2018                                                     

                                                      Located within [facility]                    

 

                          HEPATITIS PANEL                            <td ID="[id number]">HBcAb,

 IgM</td><td>Negative</td><td>NEG</td><td>BT MAIN-STATION 3</td><td 
ID="[id number]"/>    Negative                            NEG      

                          2018                                                     

                                                      Located within [facility]                    

 

                    T PROT/CREA RATIO,UR                            Creatinine, Ur      114.2             

                          20 - 320                            2018                        

                                                                            Located within [facility]              

      

 

                    T PROT/CREA RATIO,UR                            T Prot, Ur          0.09                  

                                                2018                                       

                                                      Located within [facility]                    

 

                    T PROT/CREA RATIO,UR                            T Prot/Crea Ratio,Ur    0.08        

                          0.0 - 0.5                            2018                  

                                                                            Located within [facility]        

            

 

                          COMPREHENSIVE METABOLIC PANEL(DBIL NOT INCLUDED)                            <td ID="[id number]">Albumin</td><td>4.6</td><td>3.7

 - 5.3 g/dL</td><td>BT MAIN-STATION 1</td><td 
ID="[id number]"/>    4.6                            3.7 - 5.3     

                          2018                                                    

                                                      Located within [facility]                    

 

                          COMPREHENSIVE METABOLIC PANEL(DBIL NOT INCLUDED)                            <td ID="[id number]">Calcium</td><td>9.0</td><td>8.6

 - 10.3 mg/dL</td><td&gt;BT MAIN-STATION 1</td><td 
ID="Buzzco803247420Iejo9Pckbjcztl"/>    9.0                            8.6 - 10.3    

                          2018                                                   

                                                      Located within [facility]                    

 

                          COMPREHENSIVE METABOLIC PANEL(DBIL NOT INCLUDED)                            <td ID="Cavqff080332519Wzly8Ulaq">CO2</td><td>31</td><td>21

 - 31 mmol/L</td><td>BT MAIN-STATION 1</td><td 
ID="[id number]"/>    31                            21 - 31        

                    2018                                                         

                                        Located within [facility]                    

 

                          COMPREHENSIVE METABOLIC PANEL(DBIL NOT INCLUDED)                            <td ID="[id number]">Chloride</td><td>99</td><td>98

 - 107 mmol/L</td><td>BT MAIN-STATION 1</td><td 
ID="[id number]"/>    99                            98 - 107       

                     2018                                                        

                                        Located within [facility]                    

 

                          COMPREHENSIVE METABOLIC PANEL(DBIL NOT INCLUDED)                            <td ID="Xcpolz956745251Kuef1Agmg">Creatinine</td><td><span

 style="flagData">0.50</span><span style="flagData"> (L)</span></td><td>0.6 - 
1.2 mg/dL</td><td>BT MAIN-STATION 1</td>&lt;td 
ID="[id number]"/>    0.50                            0.6 - 1.2    

                          2018                                                   

                                                      Located within [facility]                    

 

                          COMPREHENSIVE METABOLIC PANEL(DBIL NOT INCLUDED)                            <td ID="[id number]">Glucose</td><td>84</td><td>70

 - 110 mg/dL</td><td>BT MAIN-STATION 1</td><td 
ID="[id number]"/>    84                            70 - 110       

                     2018                                                        

                                        Located within [facility]                    

 

                          COMPREHENSIVE METABOLIC PANEL(DBIL NOT INCLUDED)                            <td ID="Azjhoj355520960Byyt2Kiuo">Alkaline

 Phosphatase, S</td><td>90</td><td>34 - 104 U/L</td>&lt;td>BT MAIN-STATION 
1</td><td ID="[id number]"/>    90                            34 - 

104                            2018                                            

                                                      Located within [facility]                    

 

                          COMPREHENSIVE METABOLIC PANEL(DBIL NOT INCLUDED)                            <td ID="[id number]">Potassium</td><td>4.6</td><td>3.5

 - 5.1 mmol/L</td><td&gt;BT MAIN-STATION 1</td><td 
ID="Hyvmms104178089Fywn4Bizseubot"/>    4.6                            3.5 - 5.1     

                          2018                                                    

                                                      Located within [facility]                    

 

                          COMPREHENSIVE METABOLIC PANEL(DBIL NOT INCLUDED)                            <td ID="Cwwhhx609689960Qmgz6Aqlo">Sodium</td><td>139</td><td>136

 - 145 mmol/L</td><td>BT MAIN-STATION 1</td><td 
ID="[id number]"/>    139                            136 - 145     

                          2018                                                    

                                                      Located within [facility]                    

 

                          COMPREHENSIVE METABOLIC PANEL(DBIL NOT INCLUDED)                            <td ID="[id number]">ALT</td><td><span

 style="flagData">66</span><span style="flagData"> (H)</span></td><td>7 - 52 
U/L</td><td>BT MAIN-STATION 1</td><td ID=&quot;[id number]"/>
                    66                            7 - 52                            2018          

                                                                            Located within [facility]

                    

 

                          COMPREHENSIVE METABOLIC PANEL(DBIL NOT INCLUDED)                            <td ID="[id number]">AST

 (SGOT)</td><td><span style="flagData">55</span><span style="flagData"> 
(H)</span></td><td>13 - 39 U/L</td><td> MAIN-STATION 1</td><td 
ID="Oxzged334740641Msqn35Cyepdiadx"/>    55                            13 - 39       

                     2018                                                        

                                        Located within [facility]                    

 

                          COMPREHENSIVE METABOLIC PANEL(DBIL NOT INCLUDED)                            <td ID="[id number]">BUN</td><td>7</td><td>7

 - 25 mg/dL</td><td> MAIN-STATION 1</td><td 
ID="[id number]"/>    7                            7 - 25         

                    2018                                                          

                                        Located within [facility]                    

 

                          COMPREHENSIVE METABOLIC PANEL(DBIL NOT INCLUDED)                            <td ID="Qcytnc095250425Ehmf20Qsfa">Bilirubin,

 Total</td><td>0.6</td><td>0.2 - 1.2 mg/dL</td><td> MAIN-STATION 1</td><td 
ID="[id number]"/>    0.6                            0.2 - 1.2    

                          2018                                                   

                                                      Located within [facility]                    

 

                          COMPREHENSIVE METABOLIC PANEL(DBIL NOT INCLUDED)                            <td ID="Xrlnef843696209Amxz18Qygk">Protein,

 Total, Serum</td><td>8.1</td><td>6.0 - 8.3 g/dL</td>&amp;lt;td> MAINSTATION 
1</td><td ID="[id number]"/>    8.1                            6 -

 8.3                            2018                                           

                                                      Located within [facility]                    

 

                          COMPREHENSIVE METABOLIC PANEL(DBIL NOT INCLUDED)                            GFR, Estimated

                    >60                            mL/min/1.73 m2                            2018

                                                                                    Located within [facility]                    

 

                          COMPREHENSIVE METABOLIC PANEL(DBIL NOT INCLUDED)                            eGFR If

 Africn Am          >60                            mL/min/1.73 m2                            

2018                                                                           

                                        Located within [facility]                    

 

                          COMPREHENSIVE METABOLIC PANEL(DBIL NOT INCLUDED)                            Anion 

Gap                 9                                                        2018             

                                                                            Located within [facility]   

                 

 

                          COMPREHENSIVE METABOLIC PANEL(DBIL NOT INCLUDED)                            Lab Interpretation

                    Abnormal                                                        2018         

                                                                            Located within [facility]

                    

 

                          TSH                            <td ID="[id number]">TSH</td><td>1.87</td><td>0.57

 - 3.74 uIU/mL</td><td>BT MAIN-STATION 1</td><td 
ID="Czcurv048452569Bypb9Sscrgvvpv"/>    1.87                            0.57 - 3.74  

                          2018                                                 

                                                      Located within [facility]                    



                                                                                
                                                                                
                                                                                
                                                                                
                                                                                
                                                                                
                                                                                
                                                                                
                                                                                
                                                                                
                              



Pathology Reports







                                        No Data Provided for This Section                    



                                                



Diagnostic Reports

                    





                    Report                            Value                            Date             

                                        Source                    

 

                          CT CHEST W CONTRAST                            IMPRESSION: 1.Lungs are clear.2.Nonspecific

 mildly enlarged left axillary lymph nodes.3.Hepatic steatosis.   Signed By: 
Fabio Arnett M.D., 2018 10:38 AM  EXAM:CT CHEST W CONTRAST DATE:  10:02 AM  INDICATION: sle with abnormal chest x ray ADDITIONAL 
INFORMATION:None. TECHNIQUE: Post IV contrast CT chest acquisition with sagittal
andcoronal reformats and axial MIPPED images rendered. COMPARISON: Chest x-ray 
2018 DISCUSSION: Visualized lower neck. Unremarkable. Thyroid.The visible 
portions are unremarkable. Aorta.No evidence of aortic aneurysm or dissection. 
Mild aorticatherosclerotic calcifications. Pulmonary arteries.Pulmonary arteries
are grossly unremarkable. Trachea and proximal airways.Trachea and proximal 
airways are patent. Lungs. Unremarkable. Thoracic lymph nodes.No adenopathy is 
seen. Nonspecific mildlyenlarged left axillary lymph nodes measuring up to 1.2 
cm. Visualized subdiaphragmatic structures.Hepatic steatosis. Soft 
tissues.Unremarkable. Regional Skeleton.No lytic or blastic lesions. Interface, 
Rad/Mammog In - 2018 10:43 AM CST

EXAM:  CT CHEST W CONTRAST

DATE: 2018 10:02 AM 

INDICATION: sle with abnormal chest x ray

ADDITIONAL INFORMATION:None.

TECHNIQUE: Post IV contrast CT chest acquisition with sagittal and

coronal reformats and axial MIPPED images rendered.

COMPARISON: Chest x-ray 2018

DISCUSSION:  

Visualized lower neck. Unremarkable.

Thyroid.  The visible portions are unremarkable.

Aorta.No evidence of aortic aneurysm or dissection. Mild aortic

atherosclerotic calcifications.

Pulmonary arteries.Pulmonary arteries are grossly unremarkable.

Trachea and proximal airways.Trachea and proximal airways are patent.

Lungs. Unremarkable.

Thoracic lymph nodes.  No adenopathy is seen. Nonspecific mildly

enlarged left axillary lymph nodes measuring up to 1.2 cm.

Visualized subdiaphragmatic structures.  Hepatic steatosis.

Soft tissues.  Unremarkable.

Regional Skeleton.  No lytic or blastic lesions.

IMPRESSION

IMPRESSION: 

                                        1.  Lungs are clear.

                                        2.  Nonspecific mildly enlarged left axillary lymph nodes.

                                        3.  Hepatic steatosis.

 

Signed By: Fabio Arnett M.D., 2018 10:38 AM

                            2018                            [email or web address]      

              

 

                          XRAY SHOULDER 2 VIEWS MIN                            IMPRESSION: 1.No acute radiographic

 abnormality.2.Calcific tendinopathy of the rotator cuff. Dictated By: Jessica Moon MD, 2018 11:40 AM I have reviewed the study and agree with the 
findings in this report. Signed By: Deonte Barbosa DO, 2018 2:54 PM EXAM: 
RIGHT XRAY SHOULDER 2 VIEWS MIN  CLINICAL INDICATION:pain  COMPARISON: None. 
DISCUSSION:No fracture or dislocation. Mild degenerative changes of the shoulder
and acromioclavicular joint.Calcific tendinopathy the rotator cuff. Interface, 
Rad/Mammog In - 2018  2:59 PM CDTEXAM: RIGHT XRAY SHOULDER 2 VIEWS MIN 

CLINICAL INDICATION:  pain 

COMPARISON: None.

DISCUSSION:  

    

No fracture or dislocation. 

Mild degenerative changes of the shoulder and acromioclavicular joint.

Calcific tendinopathy the rotator cuff.

IMPRESSION

IMPRESSION:

                                        1.  No acute radiographic abnormality.

                                        2.  Calcific tendinopathy of the rotator cuff.

Dictated By: Jessica Moon MD, 2018 11:40 AM

I have reviewed the study and agree with the findings in this report.

Signed By: Deonte Barbosa DO, 2018 2:54 PM

                            2018                            Located within [facility]      

              

 

                          XRAY SPINE LUMBOSACRAL AP-LAT                            IMPRESSION:1.No acute radiographic

 abnormality. 2.Mild degenerative changes at L5-S1. Dictated By: Jessica Moon MD, 2018 11:38 AM I have reviewed the study and agree with the findings in 
this report. Signed By: Deonte Barbosa DO, 2018 2:54 PM Lumbar Spine 
Radiographs - 3 view(s) HISTORY:back pain COMPARISON: None 
DISCUSSION:Bone:Osseous structures are partially obscured by stool and bowel gas
.Five nonrib-bearing lumbar vertebral bodies.Normal alignment.No displaced 
fracture or compression deformity. Discs:Mild disc space narrowing at L5-S1. 
Joints:Mild degenerative changes of the L5-S1 facets. Interface, Rad/Mammog In -
2018  2:59 PM CDTLumbar Spine Radiographs - 3 view(s)

HISTORY:  back pain

COMPARISON: None

DISCUSSION:

Bone:

Osseous structures are partially obscured by stool and bowel gas.

Five nonrib-bearing lumbar vertebral bodies.

Normal alignment.

No displaced fracture or compression deformity.

Discs:

Mild disc space narrowing at L5-S1.

Joints:

Mild degenerative changes of the L5-S1 facets.

IMPRESSION

IMPRESSION:

                                        1.  No acute radiographic abnormality. 

                                        2.  Mild degenerative changes at L5-S1.

Dictated By: Jessica Moon MD, 2018 11:38 AM

I have reviewed the study and agree with the findings in this report.

Signed By: Deonte Barbosa DO, 2018 2:54 PM

                            2018                            Located within [facility]      

              

 

                          XRAY CHEST 2 VIEWS                            IMPRESSION: 1.Persistent mild right 

medial infrahilar opacities, likelyatelectasis/scarring.2.No consolidations, 
cavitary lesions, pleural effusions. CT could beobtained for further evaluation 
if indicated. Dictated By: Zachary Haro MD, 2018 11:45 AM I have reviewed 
the study and agree with the findings in this report. Signed By: Donavon Gatica MD, 2018 11:46 AM EXAMINATION:XRAY CHEST 2 VIEWS INDICATION: screen tb 
COMPARISON:10/21/2012 FINDINGS: Devices, Lines, and Tubes: None. Heart and 
Mediastinum: Unremarkable. Lungs and Pleura: Persistent mild infrahilar 
opacities in the rightmedial lung. Few bilateral scattered calcified granulomas,
the greatestis noted to be in the left upper lung. No pneumothoraces. Bones and 
Soft Tissues: Moderate degenerative changes of the superiorcostochondral joints 
bilaterally. Moderate anterior osteophytosis of themid thoracic spine. 
Interface, Rad/Mammog In - 2018 11:52 AM CDTEXAMINATION:  XRAY CHEST 2 
VIEWS    

INDICATION: screen tb  

COMPARISON:  10/21/2012

     

FINDINGS: 

Devices, Lines, and Tubes: None.

Heart and Mediastinum: Unremarkable.

Lungs and Pleura: Persistent mild infrahilar opacities in the right

medial lung. Few bilateral scattered calcified granulomas, the greatest

is noted to be in the left upper lung. No pneumothoraces.

Bones and Soft Tissues: Moderate degenerative changes of the superior

costochondral joints bilaterally. Moderate anterior osteophytosis of the

mid thoracic spine.

IMPRESSION

IMPRESSION: 

                                        1.  Persistent mild right medial infrahilar opacities, likely

atelectasis/scarring.

                                        2.  No consolidations, cavitary lesions, pleural effusions. CT could be

obtained for further evaluation if indicated.

Dictated By: Zachary Haro MD, 2018 11:45 AM

I have reviewed the study and agree with the findings in this report.

Signed By: Donavon Gatica MD, 2018 11:46 AM

                            2018                            Located within [facility]      

              



                                                                                
                                   



Consultation Notes

                    





                                        No Data Provided for This Section                    



                                                            



Discharge Summaries

                    





                                        No Data Provided for This Section                    



                                                            



History and Physicals

                    





                                        No Data Provided for This Section                    



                                                                



Vital Signs

                     





                    Vital Sign                            Value                            Date         

                          Comments                            Source                    

 

                    Systolic (mm Hg)                            99                             2019

                                                        [facility]                

    

 

                    Diastolic (mm Hg)                            50                             2019

                                                        [facility]                

    

 

                    Heart Rate                            56                             2019     

                                                      [facility]                    

 

                          Temperature Oral (F)                            36.78 [place]                         

                    2019                                                        [facility]     

               

 

                    Respitory Rate                            18                             2019 

                                                       Located within [facility]                 

   

 

                    Height                            154.9 cm                            2019    

                                                      Located within [facility]                    



 

                    Weight                            86.637                             2019     

                                                      [facility]                    

 

                    Systolic (mm Hg)                            99                             2018

                                                        [facility]                

    

 

                    Diastolic (mm Hg)                            46                             2018

                                                        Located within [facility]                

    

 

                    Heart Rate                            58                             2018     

                                                      [facility]                    

 

                          Temperature Oral (F)                            36.83 [place]                         

                    2018                                                        [facility]     

               

 

                    Respitory Rate                            18                             2018 

                                                       [facility]                 

   

 

                    Height                            154.9 cm                            2018    

                                                      [facility]                    



 

                    Weight                            78.019                             2018     

                                                      Located within [facility]                    

 

                    BMI Calculated                            32.50                             2018

                                                        [facility]                

    

 

                    Weight                            185                             2018        

                                                      [facility]

                    

 

                    Height                            61                             2018         

                                                      [facility] 

                   

 

                    Temperature Oral (F)                            98.4 F                            2018

                                                        [facility]                    

 

                    Heart Rate                            53                             2018     

                                                      [facility]

                    

 

                    Diastolic (mm Hg)                            62                             2018

                                                        [facility]                    

 

                    Systolic (mm Hg)                            96                             2018

                                                        [facility]                    

 

                    Weight                            178                             2018        

                                                      [facility]

                    

 

                    Height                            61                             2018         

                                                      [facility] 

                   

 

                    Heart Rate                            56                             2018     

                                                      [facility]

                    

 

                    Diastolic (mm Hg)                            60                             2018

                                                        [facility]                    

 

                    Systolic (mm Hg)                            98                             2018

                                                        [facility]                    

 

                    Weight                            179                             05/15/2018        

                                                      [facility]

                    

 

                    Height                            61                             05/15/2018         

                                                      [facility] 

                   

 

                    Temperature Oral (F)                            98.2 F                            05/15/2018

                                                        [facility]                    

 

                    Heart Rate                            59                             05/15/2018     

                                                      [facility]

                    

 

                    Diastolic (mm Hg)                            58                             05/15/2018

                                                        [facility]                    

 

                    Systolic (mm Hg)                            96                             05/15/2018

                                                        [facility]                    

 

                    Weight                            173                             2018        

                                                      [facility]

                    

 

                    Height                            61                             2018         

                                                      [facility] 

                   

 

                    Temperature Oral (F)                            98.1 F                            2018

                                                        [facility]                    

 

                    Heart Rate                            55                             2018     

                                                      [facility]

                    

 

                    Diastolic (mm Hg)                            62                             2018

                                                        [facility]                    

 

                    Systolic (mm Hg)                            118                             2018

                                                        [facility]                    

 

                    Weight                            164                             2016        

                                                      [facility]

                    

 

                    Height                            61                             2016         

                                                      [facility] 

                   

 

                    Temperature Oral (F)                            98.0 F                            2016

                                                        [facility]                    

 

                    Heart Rate                            60                             2016     

                                                      [facility]

                    

 

                    Diastolic (mm Hg)                            60                             2016

                                                        [facility]                    

 

                    Systolic (mm Hg)                            112                             2016

                                                        [facility]                    

 

                    Weight                            159                             2015        

                                                      [facility]

                    

 

                    Height                            61                             2015         

                                                      [facility] 

                   

 

                    Heart Rate                            64                             2015     

                                                      [facility]

                    

 

                    Diastolic (mm Hg)                            60                             2015

                                                        [facility]                    

 

                    Systolic (mm Hg)                            102                             2015

                                                        [facility]                    

 

                    Weight                            160                             2015        

                                                      [facility]

                    

 

                    Height                            61                             2015         

                                                      [facility] 

                   

 

                    Heart Rate                            63                             2015     

                                                      [facility]

                    

 

                    Diastolic (mm Hg)                            58                             2015

                                                        [facility]                    

 

                    Systolic (mm Hg)                            98                             2015

                                                        [facility]                    

 

                    Weight                            162                             2015        

                                                      [facility]

                    

 

                    Height                            61                             2015         

                                                      [facility] 

                   

 

                    Heart Rate                            60                             2015     

                                                      [facility]

                    

 

                    Diastolic (mm Hg)                            64                             2015

                                                        [facility]                    

 

                    Systolic (mm Hg)                            110                             2015

                                                        [facility]                    

 

                    Weight                            168                             2015        

                                                      [facility]

                    

 

                    Height                            61                             2015         

                                                      [facility] 

                   

 

                    Heart Rate                            60                             2015     

                                                      [facility]

                    

 

                    Diastolic (mm Hg)                            62                             2015

                                                        [facility]                    

 

                    Systolic (mm Hg)                            100                             2015

                                                        [facility]                    

 

                    Weight                            169                             2014        

                                                      [facility]

                    

 

                    Height                            61                             2014         

                                                      [facility] 

                   

 

                    Heart Rate                            58                             2014     

                                                      [facility]

                    

 

                    Diastolic (mm Hg)                            50                             2014

                                                        [facility]                    

 

                    Systolic (mm Hg)                            98                             2014

                                                        [facility]                    

 

                    Weight                            165                             10/29/2014        

                                                      [facility]

                    

 

                    Height                            61                             10/29/2014         

                                                      [facility] 

                   

 

                    Heart Rate                            62                             10/29/2014     

                                                      [facility]

                    

 

                    Diastolic (mm Hg)                            60                             10/29/2014

                                                        [facility]                    

 

                    Systolic (mm Hg)                            124                             10/29/2014

                                                        [facility]                    



                                                                                
                                                                                
                                                                                
                                                                                
                                                                                
                                                                                
                                                                                
                                                                                
                                                                                
                                                                                
                                                                                
                                                                                
                                                                                
                                                                                
                                                                                
                                         



Encounters

                    





                    Location                            Location Details                            Encounter

 Type                            Encounter Number                            Reason For

 Visit                            Attending Provider                            ADM Date

                            DC Date                            Status                

                                        Source                    

 

                                        [facility]                       

                                                LEFT KNEE PAIN/ SWELLLING                            [id number]

                                                                                      10/29/2014

                            10/29/2014                                               

                                        [facility]                    

 

                                        [facility]                       

                                                LEFT KNEE PAIN/ SWELLLING                            [id number]

                                                                                      10/29/2014

                            10/29/2014                                               

                                        [facility]                    

 

                                        [facility]                       

                                                LEFT KNEE PAIN/ SWELLLING                            [id number]

                                                                                      10/29/2014

                            10/29/2014                                               

                                        [facility]                    

 

                                        [facility]                       

                                                LEFT KNEE PAIN/ SWELLLING                            [id number]

                                                                                      10/29/2014

                            10/29/2014                                               

                                        [facility]                    

 

                                        [facility]                       

                                                LEFT KNEE PAIN/ SWELLLING                            [id number]

                                                                                      10/29/2014

                            10/29/2014                                               

                                        [facility]                    

 

                                        [facility]                       

                                                LEFT KNEE PAIN/ SWELLLING                            [id number]

                                                                                      10/29/2014

                            10/29/2014                                               

                                        [facility]                    

 

                                        [facility]                       

                                                LEFT KNEE PAIN/ SWELLLING                            [id number]

                                                                                      10/29/2014

                            10/29/2014                                               

                                        [facility]                    

 

                                        [facility]                       

                                                LEFT KNEE PAIN/ SWELLLING                            [id number]

                                                                                      10/29/2014

                            10/29/2014                                               

                                        [facility]                    

 

                                        [facility]                       

                                                LEFT KNEE PAIN/ SWELLLING                            [id number]

                                                                                      10/29/2014

                            10/29/2014                                               

                                        [facility]                    

 

                                        [facility]                       

                                                LEFT KNEE PAIN/ SWELLLING                            [id number]

                                                                                      10/29/2014

                            10/29/2014                                               

                                        [facility]                    

 

                                        [facility]                       

                                                      Needs call back from Medical Staff                  

                          [id number]                                       

                                                  10/30/2014                            10/30/2014

                                                        [facility]                    

 

                                        [facility]                       

                                                      Needs call back from Medical Staff                  

                          [id number]                                       

                                                  10/30/2014                            10/30/2014

                                                        [facility]                    

 

                                        [facility]                       

                                                      Needs call back from Medical Staff                  

                          [id number]                                       

                                                  10/30/2014                            10/30/2014

                                                        [facility]                    

 

                                        [facility]                       

                                                      Needs call back from Medical Staff                  

                          [id number]                                       

                                                  10/30/2014                            10/30/2014

                                                        [facility]                    

 

                                        [facility]                       

                                                      Needs call back from Medical Staff                  

                          [id number]                                       

                                                  10/30/2014                            10/30/2014

                                                        [facility]                    

 

                                        [facility]                       

                                                      Needs call back from Medical Staff                  

                          [id number]                                       

                                                  10/30/2014                            10/30/2014

                                                        [facility]                    

 

                                        [facility]                       

                                                      Needs call back from Medical Staff                  

                          [id number]                                       

                                                  10/30/2014                            10/30/2014

                                                        [facility]                    

 

                                        [facility]                       

                                                      Needs call back from Medical Staff                  

                          [id number]                                       

                                                  10/30/2014                            10/30/2014

                                                        [facility]                    

 

                                        [facility]                       

                                                      Needs call back from Medical Staff                  

                          [id number]                                       

                                                  10/30/2014                            10/30/2014

                                                        [facility]                    

 

                                        [facility]                       

                                                      Needs call back from Medical Staff                  

                          [id number]                                       

                                                  10/30/2014                            10/30/2014

                                                        [facility]                    

 

                                        [facility]                       

                                                Needs referral                            [id number]

                                                                                      2014                                               

                                        [facility]                    

 

                                        [facility]                       

                                                Needs referral                            [id number]

                                                                                      2014                                               

                                        [facility]                    

 

                                        [facility]                       

                                                Needs referral                            [id number]

                                                                                      2014                                               

                                        [facility]                    

 

                                        [facility]                       

                                                Needs referral                            [id number]

                                                                                      2014                                               

                                        [facility]                    

 

                                        [facility]                       

                                                Needs referral                            [id number]

                                                                                      2014                                               

                                        [facility]                    

 

                                        [facility]                       

                                                Needs referral                            [id number]

                                                                                      2014                                               

                                        [facility]                    

 

                                        [facility]                       

                                                Needs referral                            [id number]

                                                                                      2014                                               

                                        [facility]                    

 

                                        [facility]                       

                                                Needs referral                            [id number]

                                                                                      2014                                               

                                        [facility]                    

 

                                        [facility]                       

                                                Needs referral                            [id number]

                                                                                      2014                                               

                                        [facility]                    

 

                                        [facility]                       

                                                Needs referral                            [id number]

                                                                                      2014                                               

                                        [facility]                    

 

                                        [facility]                       

                                                WWE                            [id number]

                                                                                      2014                                               

                                        [facility]                    

 

                                        [facility]                       

                                                WWE                            [id number]

                                                                                      2014                                               

                                        [facility]                    

 

                                        [facility]                       

                                                WWE                            [id number]

                                                                                      2014                                               

                                        [facility]                    

 

                                        [facility]                       

                                                WWE                            [id number]

                                                                                      2014                                               

                                        [facility]                    

 

                                        [facility]                       

                                                WWE                            [id number]

                                                                                      2014                                               

                                        [facility]                    

 

                                        [facility]                       

                                                WWE                            [id number]

                                                                                      2014                                               

                                        [facility]                    

 

                                        [facility]                       

                                                WWE                            [id number]

                                                                                      2014                                               

                                        [facility]                    

 

                                        [facility]                       

                                                LAB RESULTS                            [id number]

                                                                                      2015                                               

                                        [facility]                    

 

                                        [facility]                       

                                                LAB RESULTS                            [id number]

                                                                                      2015                                               

                                        [facility]                    

 

                                        [facility]                       

                                                LAB RESULTS                            [id number]

                                                                                      2015                                               

                                        [facility]                    

 

                                        [facility]                       

                                                LAB RESULTS                            [id number]

                                                                                      2015                                               

                                        [facility]                    

 

                                        [facility]                       

                                                LAB RESULTS                            [id number]

                                                                                      2015                                               

                                        [facility]                    

 

                                        [facility]                       

                                                LAB RESULTS                            [id number]

                                                                                      2015                                               

                                        [facility]                    

 

                                        [facility]                       

                                                 STUDY QUESTIONAIRE                            [id number]

                                                                                      2015                                               

                                        [facility]                    

 

                                        [facility]                       

                                                 STUDY QUESTIONAIRE                            [id number]

                                                                                      2015                                               

                                        [facility]                    

 

                                        [facility]                       

                                                 STUDY QUESTIONAIRE                            [id number]

                                                                                      2015                                               

                                        [facility]                    

 

                                        [facility]                       

                                                 STUDY QUESTIONAIRE                            [id number]

                                                                                      2015                                               

                                        [facility]                    

 

                                        [facility]                       

                                                 STUDY QUESTIONAIRE                            [id number]

                                                                                      2015                                               

                                        [facility]                    

 

                                        [facility]                       

                                                 STUDY QUESTIONAIRE                            [id number]

                                                                                      2015                                               

                                        [facility]                    

 

                                        [facility]                       

                                                Unknown                            [id number]

                                                                                      2015                                               

                                        [facility]                    

 

                                        [facility]                       

                                                Unknown                            [id number]

                                                                                      2015                                               

                                        [facility]                    

 

                                        [facility]                       

                                                Unknown                            [id number]

                                                                                      2015                                               

                                        [facility]                    

 

                                        [facility]                       

                                                Unknown                            [id number]

                                                                                      2015                                               

                                        [facility]                    

 

                                        [facility]                       

                                                H A/ SORE THROAT                            [id number]

                                                                                      2015                                               

                                        [facility]                    

 

                                        [facility]                       

                                                H A/ SORE THROAT                            [id number]

                                                                                      2015                                               

                                        [facility]                    

 

                                        [facility]                       

                                                H A/ SORE THROAT                            [id number]

                                                                                      2015                                               

                                        [facility]                    

 

                                        [facility]                       

                                                H A/ SORE THROAT                            [id number]

                                                                                      2015                                               

                                        [facility]                    

 

                                        [facility]                       

                                                left eye                            [id number]

                                                                                      2015                                               

                                        [facility]                    

 

                                        [facility]                       

                                                left eye                            [id number]

                                                                                      2015                                               

                                        [facility]                    

 

                                        [facility]                       

                                                left eye                            [id number]

                                                                                      2015                                               

                                        [facility]                    

 

                                        [facility]                       

                                                left eye                            [id number]

                                                                                      2015                                               

                                        [facility]                    

 

                                        [facility]                       

                                                      insomnia and stress, headaches                      

                          [id number]                                           

                                                  2015

                                                        [facility]                    

 

                                        [facility]                       

                                                      insomnia and stress, headaches                      

                          [id number]                                           

                                                  2015

                                                        [facility]                    

 

                                        [facility]                       

                                                      insomnia and stress, headaches                      

                          [id number]                                           

                                                  2015

                                                        [facility]                    

 

                                        [facility]                       

                                                physical, wwe                            [id number]

                                                                                      2015                                               

                                        [facility]                    

 

                                        [facility]                       

                                                physical, wwe                            [id number]

                                                                                      2015                                               

                                        [facility]                    

 

                                        [facility]                       

                                                      Dizziness, loosing balance when trying to stand up  

                                        [id number]                     

                                                                              2016                                                        [facility]                    

 

                          [facility]                                                    

                    Telephone                            [phone]                                     

                          Angelica Palmer                             2018         

                                                                            Located within [facility]

                    

 

                    [facility]                                                        Office

 Visit                            [id number]                                          

                          Evangelina Flores MD                            2018                                                        Located within [facility]

                    

 

                    LABORATORY [facility]                                                        

Hospital Encounter                            [id number]                              

                          Evangelina Flores MD                            2018                                                  

                                        Located within [facility]                    

 

                    [facility]                                                        Office

 Visit                            [id number]                                          

                          Evangelina Flores MD                            2018                                                        Located within [facility]

                    

 

                    [facility]                                                        Refill

                            [id number]                                                

                          Evangelina Flores MD                            2018                     

                                                                            Located within [facility]           

         

 

                    [facility]                                                        Office Visit

                            [id number]                                                

                          Nupur Henry MD                            2018                                                        Located within [facility]

                    

 

                    Radiology [place]                                                        Ancillary

 Procedure                            [id number]                                      

                          Eleanor Agarwal MD                            2018                                                        Located within [facility]

                    

 

                    [facility]                                                        Office Visit

                            [id number]                                                

                          Nupur Henry MD                            10/22/2018                  

                    10/22/2018                                                        Located within [facility]

                    

 

                    [facility]                                                        Refill   

                          [id number]                                                   

                          Gretchen Peres LVN                            2018                 

                                                                            Located within [facility]       

             

 

                    [facility]                                                        Orders

 Only                            [id number]                                           

                          Bree Gómez MD                            2018                    

                                                                            [facility]                                                    

                    Telephone                            [phone]                                     

                          Oseas Escalona                             2018             

                                                                            Located within [facility]   

                 

 

                    [facility]                                                        Office

 Visit                            [id number]                                          

                          Bree Gómez MD                            2018                                                        Located within [facility]

                    

 

                    CT Scan [facility]                                                        Hospital Encounter

                            [id number]                                                

                          Nupur Henry MD                            2018                                                        Located within [facility]

                    

 

                    [facility]                                                        Telephone  

                          [id number]                                                  

                          Nupur Mcqueen                            2018             

                                                                            Located within [facility]   

                 

 

                                                                            Travel                      

                    [id number]                                                                        

                    2018                                                               

                                        [facility]                                                        Hospital

 Encounter                            [id number]                                      

                          Nupur Henry MD                            2018                                                        [email or web address]                    

 

                                                                            Travel                      

                    [id number]                                                                        

                    2019                                                               

                                        Located within [facility]                    

 

                    [facility]                                                        Office

 Visit                            [id number]                                          

                          Bree Gómez MD                            2019                                                        Located within [facility]

                    

 

                          [facility] [place]                                                 

                    Office Visit                            [id number]                               

                          Bree Gómez MD                            2019                                                        [email or web address]                    

 

                                                                            Travel                      

                    [id number]                                                                        

                    2019                                                               

                                        Located within [facility]                    

 

                    [facility]                                                        Office Visit

                            [id number]                                                

                          Nupur Henry MD                            2019                                                        Located within [facility]

                    

 

                    [facility]                                                        Telephone

                            [phone]                                                

                          Bree Gómez MD                            2019                         

                                                                            Located within [facility]               

     

 

                    [facility]                                                        Refill

                            [id number]                                                

                          Bree Gómez MD                            2019                         

                                                                            [facility]                                                    

                    Telephone                            [phone]                                     

                          Saul Delgado                             2019             

                                                                            [facility]   

                 

 

                                                                            Travel                      

                    [id number]                                                                        

                    2019                                                               

                                        Located within [facility]                    

 

                    [facility]                                                        Office

 Visit                            [id number]                                          

                          Bree Gómez MD                            2019                                                        [facility]

                    

 

                                                                            Travel                      

                    [phone]                                                                        

                    2019                                                               

                                        Located within [facility]                    

 

                          [facility] [place]                                                 

                    Office Visit                            [id number]                               

                          Arabella Argueta                             2019                                                        Located within [facility]                    



                                                                                
                                                                                
                                                                                
                                                                                
                                                                                
                                                                                
                                                                                
                                                                                
                                                                                
                                                                                
                                                                                
                                                                                
                                                                                
                                                                                
                                                                  



Procedures

                    





                    Procedure                            Code                            Date           

                          Perfomer                            Comments                        

                                        Source                    

 

                    SED RATE                            [id number]                            2019     

                          [facility]                    

 

                    C-REACTIVE PROT                            [id number]                            2019

                            [facility]                    

 

                    LIVER PROFILE                            31183                            2019

                            [facility]                    

 

                    ANTI DSDNA BY CRITHIDIA                            [id number]                            

2019                            [facility]                    

 

                    COMPLEMENT C3                            25506                            2019

                            [facility]                    

 

                    COMPLEMENT C4                            62322                            2019

                            [facility]                    

 

                          TOTAL PROTEIN/CREATININE RATIO, URINE                            09071            

                          2019                            [facility]                    

 

                          PULMONARY - 6 MINUTE WALK EXERCISE TEST - COMPLEX                            97576

                            2018                            Unknown            

                                                      Located within [facility]                    

 

                    PULMONARY FUNCTION TEST                            31512                            

2018                            Unknown                                        

                                        Located within [facility]                    

 

                    CT CHEST W CONTRAST                            14818                            2018

                            [facility]                    

 

                    CT CHEST W CONTRAST                            10277                            2018

                            [facility]                    

 

                    COMPLEMENT C4                            31278                            10/31/2018

                            [facility]                    

 

                    COMPLEMENT C3                            58842                            10/31/2018

                            [facility]                    

 

                    LIVER PROFILE                            47364                            10/31/2018

                            [facility]                    

 

                    CBC/DIFF                            90676                            10/31/2018     

                          [facility]                    

 

                    SJOGREN'S AB                            80581                            10/31/2018 

                           [facility]                    

 

                    RA FACTOR                            02809                            10/31/2018    

                          [facility]                    

 

                    C-REACTIVE PROT                            26755                            10/31/2018

                            [facility]                    

 

                    ANTI DSDNA BY CRITHIDIA                            90764                            

10/31/2018                            [facility]                    

 

                          UREA NITROGEN/CREATININE                            88790                         

                    10/31/2018                            [facility]                    

 

                    COMPLEMENT C4                            09454                            10/31/2018

                            [facility]                    

 

                    COMPLEMENT C3                            46508                            10/31/2018

                            [facility]                    

 

                    LIVER PROFILE                            83891                            10/31/2018

                            [facility]                    

 

                    CBC/DIFF                            85168                            10/31/2018     

                          [facility]                    

 

                    SJOGREN'S AB                            02705                            10/31/2018 

                           [facility]                    

 

                    RA FACTOR                            01840                            10/31/2018    

                          [facility]                    

 

                    C-REACTIVE PROT                            14774                            10/31/2018

                            [facility]                    

 

                    ANTI DSDNA BY CRITHIDIA                            24719                            

10/31/2018                            [facility]                    

 

                    UREA NITROGEN/CREA                            03973                            10/31/2018

                            [facility]                    

 

                    HEPATITIS PANEL                            22671                            2018

                            [facility]                    

 

                          COMPREHENSIVE METABOLIC PANEL                            15376                    

                    2018                            [facility]                    

 

                    CBC/DIFF                            44078                            2018     

                          [facility]                    

 

                    C-REACTIVE PROT                            41948                            2018

                            [facility]                    

 

                    ANTI DSDNA BY CRITHIDIA                            89447                            

2018                            [facility]                    

 

                    COMPLEMENT C3                            04148                            2018

                            [facility]                    

 

                    COMPLEMENT C4                            88914                            2018

                            [facility]                    

 

                    CCP IGG ABS                            94895                            2018  

                          [facility]                    

 

                    VIT D, 25-HYDROXY                            25172                            2018

                            [facility]                    

 

                          TOTAL PROTEIN/CREATININE RATIO, URINE                            90566            

                          2018                            [facility]                    

 

                    MARY LOU                            72922                            2018          

                    [facility]                    

 

                          XRAY SPINE LUMBOSACRAL AP-LAT                            37443                    

                    2018                            [facility]                    

 

                    XRAY CHEST 2 VIEWS                            86095                            2018

                            [facility]                    

 

                          XRAY SHOULDER 2 VIEWS MIN                            29393                        

                    2018                            [facility]                    

 

                    HEPATITIS PANEL                            83054                            2018

                            [facility]                    

 

                          COMPREHENSIVE METABOLIC PANEL(DBIL NOT INCLUDED)                            80882 

                           2018                            [facility]                    

 

                    CBC/DIFF                            73173                            2018     

                          [facility]                    

 

                    C-REACTIVE PROT                            95573                            2018

                            [facility]                    

 

                    ANTI DSDNA BY CRITHIDIA                            82720                            

2018                            [facility]                    

 

                    COMPLEMENT C3                            31415                            2018

                            [facility]                    

 

                    COMPLEMENT C4                            88192                            2018

                            [facility]                    

 

                    CCP IGG ABS                            22743                            2018  

                          [facility]                    

 

                    VIT D, 25-HYDROXY                            33903                            2018

                            [facility]                    

 

                    T PROT/CREA RATIO,UR                            71156                            2018

                            [facility]                    

 

                    MARY LOU                            11318                            2018          

                    [facility]                    

 

                          XRAY SPINE LUMBOSACRAL AP-LAT                            23721                    

                    2018                            [facility]                    

 

                    XRAY CHEST 2 VIEWS                            86365                            2018

                            [facility]                    

 

                          XRAY SHOULDER 2 VIEWS MIN                            60020                        

                    2018                            [facility]                    

 

                          THYROID STIMULATING HORMONE (TSH)                            69174                

                    2018                            [facility]                    

 

                    CBC/DIFF                            20230                            2018     

                       [facility]                    

 

                          COMPREHENSIVE METABOLIC PANEL                            69273                    

                    2018                            [facility]                    

 

                    VIT D, 25-HYDROXY                            04825                            2018

                            [facility]                    

 

                    TSH                            31332                            2018          

                    [facility]

                    

 

                    CBC/DIFF                            43151                            2018     

                       [facility]                    

 

                          COMPREHENSIVE METABOLIC PANEL(DBIL NOT INCLUDED)                            96770 

                           2018                            [facility]                    

 

                    VIT D, 25-HYDROXY                            43185                            2018

                            [facility]                    



                                                                                
                                                                                
                                                                                
                                                                                
                                                                                
                                                                                
                                                                                
                                                                                
                                                                                
                                                                                
                                                                                
                                                                                
                                                                                
                               



Assessment and Plan

                    





                                        No Data Provided for This Section                    



                                    



Plan of Care







                    Plan of Care        Date                Source

 

                          IMM Influenza Seasonal Oct to March (>/=19 yrs)                            10/01/2019

                                        Located within [facility]                    

 

                                        Upcoming EncountersDateTypeSpecialtyCare TeamDescription

                                        2019

Office Visit

Psychiatry

Bree Gómez MD[address] [phone]



Health MaintenanceDue DateLast DoneComments

Cervical Cancer Scrn (3 Yrs)

                                        1984



Breast Cancer Scrn (Yearly)

                                        2003



Colorectal Cancer Scrn Annual (FIT/FOBT) Age 50 to 75

                                        2013

                                        06/10/2010



IMM Influenza Seasonal Oct to March (>/=19 yrs)

                                        10/01/2019



                            2019                            Located within [facility]      

              

 

                                        Upcoming EncountersDateTypeSpecialtyCare TeamDescription

                                        2018

Appointment

[facility] MD[address], [place] 
[phone] (Fax)

full pft 6mwt

                                        2019

Office Visit

Psychiatry

Bree Gómez, MDDept. of Psychiatry-[address] 
[phone] (Fax)



                                        2019

Office Visit

Psychology

Bree Gómez, MDDept. of Psychiatry-[address] [place], [place] 
[phone] (Fax)

Arabella Argueta  [address] [place], [place] [phone] (Fax)



                                        2019

Office Visit

Rheumatology

[facility] MD[address], [place] 
[phone] (Fax)



Health MaintenanceDue DateLast DoneComments

Cervical Cancer Scrn (3 Yrs)

                                        1984



Breast Cancer Scrn (Yearly)

                                        2003



Colorectal Cancer Scrn Annual (FIT/FOBT) Age 50 to 75

                                        2013

                                        06/10/2010



IMM Influenza Seasonal Oct to March (>/=19 yrs)

                                        10/01/2018



                            2018                            Located within [facility]      

              

 

                          IMM Influenza Seasonal Oct to March (>/=19 yrs)                            10/01/2018

                                        Located within [facility]                    

 

                          Colorectal Cancer Scrn Annual (FIT/FOBT) Age 50 to 75                            2013

                                        Located within [facility]                    

 

                          Breast Cancer Scrn (Yearly)                            2003                 

                                        Located within [facility]                    

 

                          Cervical Cancer Scrn (3 Yrs)                            1984                

                                        Located within [facility]                    



                                                                                
                                                                                
      



Social History

                    





                    Social History                            Date                            Source    

                

 

                                        Tobacco UseTypesPacks/DayYears UsedDate

Never Smoker



Smokeless Tobacco: Never Used



Tobacco Cessation: Counseling Given: Yes



Alcohol UseDrinks/Weekoz/WeekComments

Yes

occasional

Food InsecurityAnswerDate Recorded

Within the past 12 months, you worried that your food would run out before you 
got money to buy more.

Never true

                                        2018

Within the past 12 months, the food you bought just didn't last and you didn't 
have money to get more.

Never true

                                        2018

Sex Assigned at BirthDate Recorded

Not on file



Job Start DateOccupationIndustry

Not on file

Travel HistoryTravel StartTravel End

No recent travel history available.

                            2019                            Located within [facility]      

              

 

                                        Social History ElementQualifiersDate Reported

Last Colonoscopy:

                                        .  12

Ethnicity

                                        .  Status , Is english your primary language? No [place]

2016

children

                                        .  3

2016

Flu Vaccine:

                                        .  2015

Tobacco Use:

                                        .  Are you a: never smoker

2016

Marital Status:

                                        .  

2016

Caffeine intake?

                                        .  Status: Yes, What type: Coffee

2016

Do you exercise?

                                        .  Answer: Yes, Type: walking, biking

2016

Do you drink alcohol?

                                        .  Status: Yes, How often? Socially

2016

Occupation:

employed.  Housekeeping

2016                            [employer] 

[facility] [employer]                    



                                                                                
       



Family History

                    





                    Value                            Date                            Source             

       

 

                                        QualifierDescriptionCommentDate Reported

Maternal Grandmother

Comment not available

May 22, 2015

Paternal Grandmother

Comment not available

May 22, 2015

Siblings

alive

throat cancer

May 22, 2015

Maternal Grandfather

Comment not available

May 22, 2015

Children

alive

Comment not available

May 22, 2015

Father

alive

Comment not available

May 22, 2015

Paternal Grandfather

Comment not available

May 22, 2015

Mother

alive

Parkinsons, alzheimer

May 22, 2015

Other:

Comment not available

May 22, 2015

                            2015                            [employer]  [facility] 

[facility]                    



                                                                    



Advance Directives

                    





                                        No Data Provided for This Section                    



                                                            



Functional Status

                    





                                        No Data Provided for This Section

## 2019-07-15 NOTE — XMS REPORT
[facility]

                             Created on: 2015



Crystal Elizabeth

External Reference #: [id number]

: 1965

Sex: Female



Demographics







                          Address                   [address] [place]  [address]

 

                          Home Phone                [phone]

 

                          Preferred Language        Unknown

 

                          Marital Status            Unknown

 

                          Religion Affiliation     Unknown

 

                          Race                      Unknown

 

                          Ethnic Group              /Latin





Author







                          Author                    Edwin Garrido

 

                          Organization              [employer]

 

                          Address                   Unknown

 

                          Phone                     Unavailable







Care Team Providers







                    Care Team Member Name    Role                Phone

 

                    Edwin Garriod     CP                  Unavailable



                                                                



Allergies, Adverse Reactions, Alerts

          





                    Substance           Reaction            Event Type

 

                    N.K.D.A.            Info Not Available    Non Drug Allergy



                                                                    



Encounters

          





                    Encounter           Location            Date

 

                    [facility]                 [facility]    Dec 29, 2014

 

                          LEFT KNEE PAIN/ SWELLLING    [facility]

                                        Oct 29, 2014

 

                          Needs call back from Medical Staff    [facility]                             Oct 30, 2014

 

                    Needs referral      [facility]    2014



                                                                                
                                     



Problems

          





             Problem Type    Condition    ICD-9 Code    Onset Dates    Condition Status

 

             Assessment    Abnormal pap    796.9                     Active

 

             Assessment    BMI 31.0-31.9,adult    V85.31                    Active

 

             Assessment    Encounter for routine gynecological examination    V72.31                    Active

 

                    Assessment          Encounter for screening mammogram for malignant neoplasm of breast    

V76.12                                              Active

 

             Assessment    Insomnia     307.41                    Active

 

             Assessment    Special screening for malignant neoplasms, colon    V76.51                    Active



                                                                                
                                                         



Social History

          





                    Social History Element    Qualifiers          Date Reported

 

                    children            .  1                Dec 29, 2014

 

                    Tobacco Use:        .  Are you a: never smoker    Dec 29, 2014

 

                    Marital Status:     .           Dec 29, 2014

 

                    Do you drink alcohol?    .  Status: Yes, How often? Socially    Dec 29, 2014

 

                    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



                                                                                
                                                         



Vital Signs

          





                          Date/Time:                Dec 29, 2014

 

                          Weight                    169 lbs

 

                          Height                    61 in

 

                          Cardiac Monitoring Heart Rate    58 /min

 

                          Blood Pressure Diastolic    50 mm Hg

 

                          Blood Pressure Systolic    98 mm Hg



                                                                    



Summary Purpose

          [employer] Submission

## 2019-07-15 NOTE — XMS REPORT
[facility]

                             Created on: 2014



Crystal Elizabeth

External Reference #: [id number]

: 1965

Sex: Female



Demographics







                          Address                   [address] [place]  [address]

 

                          Home Phone                [phone]

 

                          Preferred Language        Unknown

 

                          Marital Status            Unknown

 

                          Temple Affiliation     Unknown

 

                          Race                      Unknown

 

                          Ethnic Group              /Latin





Author







                          Author                    ~~Sanjuana Yeboah

 

                          Organization              [employer]

 

                          Address                   Unknown

 

                          Phone                     Unavailable







Care Team Providers







                    Care Team Member Name    Role                Phone

 

                    ~~Sanjuana Yeboah    CP                  Unavailable



                                                                



Allergies, Adverse Reactions, Alerts

          





                    Substance           Reaction            Event Type

 

                    N.K.D.A.            Info Not Available    Non Drug Allergy



                                                                    



Encounters

          





                    Encounter           Location            Date

 

                          LEFT KNEE PAIN/ SWELLLING    [facility]

                                        Oct 29, 2014

 

                          Needs call back from Medical Staff    [facility]                             Oct 30, 2014

 

                    Needs referral      [facility]    2014



                                                                                
                           



Problems

          





             Problem Type    Condition    ICD-9 Code    Onset Dates    Condition Status

 

             Assessment    Abnormal pap    796.9                     Active

 

             Assessment    Depression screening    V79.0                     Active

 

             Assessment    Toe pain, left    729.5                     Active

 

             Assessment    Sadler's Cyst    727.51                    Active

 

             Assessment    Colon cancer screening    V76.51                    Active

 

             Problem      BMI 31.0-31.9,adult    V85.31                    Active

 

             Problem      Depression    311                       Active

 

             Problem      Obesity      278.00                    Active

 

             Assessment    Obesity      278.00                    Active

 

             Assessment    BMI 31.0-31.9,adult    V85.31                    Active

 

             Problem      Abnormal pap    796.9                     Active

 

             Assessment    Left knee pain    719.46                    Active



                                                                                
                                                                                
                                    



Medications

          





        Medication    Code System    Code    Instructions    Start Date    End Date    Status    Dosage



 

          Venlafaxine HCl ER    MEDISPAN    [address]-07    75 MG Orally Once a day                        Active

                                        1 capsule with food

 

           Celebrex    MEDISPAN    [address]-48    200 MG Orally Once a day    Oct 29, 2014    Neto 27,

 2015                     Active                    1 capsule

 

        IBU     Unknown    0       800 MG Orally Three times a day                    Inactive    1 tablet



                                                                                
                           



Social History

          





                    Social History Element    Qualifiers          Date Reported

 

                    children            .  1                Oct 29, 2014

 

                    Tobacco Use:        .  Are you a: never smoker    Oct 29, 2014

 

                    Marital Status:     .           Oct 29, 2014

 

                    Do you drink alcohol?    .  Status: Yes, How often? Socially    Oct 29, 2014

 

                    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



                                                                                
                                                         



Vital Signs

          





                          Date/Time:                Oct 29, 2014

 

                          Weight                    165 lbs

 

                          Height                    61 in

 

                          Cardiac Monitoring Heart Rate    62 /min

 

                          Blood Pressure Diastolic    60 mm Hg

 

                          Blood Pressure Systolic    124 mm Hg



                                                                    



Summary Purpose

          [employer] Submission

## 2019-07-15 NOTE — XMS REPORT
[facility]

                             Created on: 2018



Crystal Elizabeth

External Reference #: [id number]

: 1965

Sex: Female



Demographics







                          Address                   [address] [place] [address]  [address]

 

                          Home Phone                [phone]

 

                          Preferred Language        Unknown

 

                          Marital Status            Unknown

 

                          Oriental orthodox Affiliation     Unknown

 

                          Race                      Unknown

 

                          Ethnic Group              /Latin





Author







                          Author                    Nahid Crystal

 

                          Organization              [employer]

 

                          Address                   Unknown

 

                          Phone                     Unavailable







Care Team Providers







                    Care Team Member Name    Role                Phone

 

                    Nahid Crystal     CP                  Unavailable



                                                                



Allergies

          No Known Allergies                                                    
                                   



Problems

          





             Problem Type    Condition    Code         Onset Dates    Condition Status

 

             Problem      Insomnia     G47.00                    Active

 

             Problem      Vitamin D deficiency    E55.9                     Active

 

             Problem      Depression    F32.9                     Active

 

             Problem      Acquired hypothyroidism    E03.9                     Active

 

             Problem      Positive MARY LOU (antinuclear antibody)    R76.8                     Active

 

             Problem      BMI 34.0-34.9,adult    Z68.34                    Active

 

                    Problem             Recurrent major depressive disorder, remission status unspecified    F33.9

                                                    Active

 

             Problem      Uses Mongolian as primary spoken language    Z78.9                     Active

 

             Problem      History of abnormal cervical Pap smear    Z87.898                   Active

 

             Problem      Prediabetes    R73.03                    Active

 

             Problem      Inflammatory arthritis    M19.90                    Active

 

             Problem      Lupus        M32.9                     Active

 

             Assessment    Vitamin D deficiency    E55.9                     Active

 

             Problem      Anxiety      F41.9                     Active



                                                                                
                                                                                
                                                        



Medications

          





        Medication    Code System    Code    Instructions    Start Date    End Date    Status    Dosage



 

             Vitamin D (Ergocalciferol)    NDC          20017325640    73854 UNIT Orally once per week    May

 15, 2018           2019        Active              1 capsule



                                                                              



Results

          No Known Results                                                      
             



Summary Purpose

          [employer] Submission

## 2019-07-15 NOTE — XMS REPORT
Clinical Summary

                             Created on: 2019



Crystal Elizabeth

External Reference #: [id number]

: 1965

Sex: Female



Demographics







                          Address                   [address] [place]  [address]

 

                          Home Phone                [phone]

 

                          Preferred Language        Unknown

 

                          Marital Status            Single

 

                          Yazidism Affiliation     CAT

 

                          Race                      Unknown

 

                          Ethnic Group              Unknown





Author







                          Author                    [facility]

 

                          Organization              [facility]

 

                          Address                   Unknown

 

                          Phone                     Unavailable







Support







                Name            Relationship    Address         Phone

 

                    Heather Crystal    [employer]                [address]

[place]  [address]                [phone]







Care Team Providers







                    Care Team Member Name    Role                Phone

 

                          PCP                       Unavailable







Allergies

No Known Allergies



Medications







                          End Date                  Status



              Medication     Sig          Dispensed     Refills      Start  



                                         Date  

 

                                                    Active



                     levothyroxine (SYNTHROID)       0                     



                           50 mcg tablet             8  

 

                                                    Active



              folic acid (FOLVITE) 1 mg     Take 1 tablet     90 tablet     3              



                     tabletIndications:     by mouth            8  



                           Inflammatory arthritis     daily.     

 

                                                    Active



              Cyclobenzaprine     Take 1 tablet     30 tablet     1              



                     (FLEXERIL) 5 mg     by mouth            8  



                           tabletIndications: Low     nightly at     



                           back pain at multiple     bedtime as     



                           sites                     needed for     



                                         Muscle     



                                         Spasms.     

 

                                                    Active



              naproxen (NAPROSYN) 250     Take 1 tablet     60 tablet     4              



                     mg tabletIndications:     by mouth 2          8  



                           Arthralgia of shoulder,     times daily     



                           unspecified laterality,     (with meals).     



                                         Low back pain at multiple      



                                         sites      

 

                                                    Active



              hydroxychloroquine     Take 1 tablet     90 tablet     3            10/22/201  



                     (PLAQUENIL) 200 mg     by mouth            8  



                           tabletIndications:        daily.     



                                         Systemic lupus      



                                         erythematosus,      



                                         unspecified SLE type,      



                                         unspecified organ      



                                         involvement status,      



                                         Sjogren's syndrome with      



                                         keratoconjunctivitis      



                                         sicca      

 

                          2019                Active



              FLUoxetine (PROZAC) 20 mg     Take 1       30 capsule     2              



                     capsuleIndications: MDD     capsule by          9  



                           (major depressive         mouth daily     



                           disorder), recurrent      for 90 days.     



                                         episode, moderate      

 

                          2019                Active



              hydrOXYzine (ATARAX) 50     Take 1 tablet     30 tablet     2              



                     mg tabletIndications: MDD     by mouth at         9  



                           (major depressive         bedtime     



                           disorder), recurrent      nightly for     



                           episode, moderate         90 days.     

 

                          2018                Discontinued



                     METRONIDAZOLE 500 MG TAB     take 1 tablet       0   



                                         (500 mg) by     



                                         oral route     



                                         every 8 hours     

 

                          2018                Discontinued



                     CIPROFLOXACIN 500 MG TAB     take 1 tablet       0   



                                         (500 mg) by     



                                         oral route     



                                         every 12     



                                         hours     

 

                          2018                Discontinued



              PREVACID 30 MG     take 1       30           6              



                     CAPIndications: Reflux     capsule (30         0  



                                         mg) by oral     



                                         route once     



                                         daily before     



                                         a meal     

 

                          2018                Discontinued



              amitriptyline (ELAVIL) 25     Take 2       60 tablet     1              



                     mg tabletIndications:     tablets by          8  



                           Insomnia, unspecified     mouth at     



                           type                      bedtime     



                                         nightly.     

 

                          10/22/2018                Discontinued



              methotrexate (RHEUMATREX)     Take 6       24 tablet     4              



                     2.5 mg tabletIndications:     tablets by          8  



                           Inflammatory arthritis     mouth weekly.     

 

                          2019                Discontinued



                     traZODone (DESYREL) 50 mg     Take 50 mg by       0   



                           tablet                    mouth at     



                                         bedtime     



                                         nightly.     

 

                          2018                Discontinued



                     citalopram (CELEXA) 20 mg     Take 20 mg by       0   



                           tablet                    mouth daily.     

 

                          2019                Discontinued



              methotrexate (RHEUMATREX)     Take 1 tablet     72 tablet     0            11/15/201  



                     2.5 mg tabletIndications:     by mouth            8  



                           Medication refill         weekly.     

 

                          01/15/2019                



              citalopram (CELEXA) 40 mg     Take 1 tablet     30 tablet     1              



                     tabletIndications: MDD     by mouth            8  



                           (major depressive         daily for 60     



                           disorder), recurrent      days.     



                                         episode, moderate      

 

                          2019                Discontinued



              traZODone (DESYREL) 100     Take 2       60 tablet     1              



                     mg tabletIndications: MDD     tablets by          8  



                           (major depressive         mouth at     



                           disorder), recurrent      bedtime     



                           episode, moderate         nightly for     



                                         60 days.     

 

                          2019                



              QUEtiapine (SEROQUEL) 50     Take 1 tablet     30 tablet     2              



                     mg tabletIndications: MDD     by mouth at         9  



                           (major depressive         bedtime     



                           disorder), recurrent      nightly for     



                           episode, moderate         90 days.     

 

                          2019                Discontinued



              citalopram (CELEXA) 40 mg     Take 1 tablet     30 tablet     2              



                     tabletIndications: MDD     by mouth            9  



                           (major depressive         daily for 90     



                           disorder), recurrent      days.     



                                         episode, moderate      







Active Problems





No known active problems



Encounters







                          Care Team                 Description



                     Date                Type                Specialty  

 

                                        



, Arabella WOODALL                         PTSD (post-traumatic stress disorder) (Primary Dx); 

Moderate episode of recurrent major depressive disorder



                     2019          Office Visit        Psychology  

 

                                                     



                           2019                Travel   

 

                                        



Bree Gómez MD                      MDD (major depressive disorder), recurrent episode, moderate 

(Primary Dx)



                     2019          Office Visit        Psychiatry  

 

                                        



Saul Delgado                Interpretation



                           2019                Telephone   

 

                                                     



                           2019                Travel   

 

                                        



Bree Gómez MD                      MDD (major depressive disorder), recurrent episode, moderate 

(Primary Dx)



                     2019          Refill              Psychiatry  

 

                                        



Bree Gómez MD                      Refill Request



                     2019          Telephone           Psychiatry  

 

                                        



Nupur Henry MD               Systemic lupus erythematosus, unspecified SLE type, unspecified

 organ involvement status (Primary Dx); 

Inflammatory arthritis; 

Fatty liver; 

High risk medication use; 

Long-term use of Plaquenil; 

Axillary lymphadenopathy; 

BMI 33.0-33.9,adult



                     2019          Office Visit        Rheumatology  

 

                                                     



                           2019                Travel   

 

                                        



Bree Gómez MD Latino, Klair E                         PTSD (post-traumatic stress disorder) (Primary Dx); 

Moderate episode of recurrent major depressive disorder



                     2019          Office Visit        Psychology  

 

                                        



Bree Gómez MD                      MDD (major depressive disorder), recurrent episode, moderate 

(Primary Dx)



                     2019          Office Visit        Psychiatry  

 

                                                     



                           2019                Travel   

 

                                        



Nupur Henry MD                



                           2018                Hospital   



                                         Encounter   

 

                                                     



                           2018                Travel   

 

                                        



Nupur Henry, Physician          Results



                     2018          Telephone           Rheumatology  

 

                                        



Nupur Henry MD                



                     2018          Hospital            Radiology  



                                         Encounter   

 

                                        



Bree Gómez MD Colon-Rivera, Nilda L, MD               MDD (major depressive disorder), recurrent episode, moderate

 (Primary Dx)



                     2018          Office Visit        Psychiatry  

 

                                        



Bree Gómez MD                      MDD (major depressive disorder), recurrent episode, moderate



                     2018          Orders Only         Psychiatry  

 

                                        



Oseas Escalona                       Interpretation



                           2018                Telephone   

 

                                        



Gretchen Negron LVN             Medication refill (Primary Dx)



                     2018          Refill              Family Practice  

 

                                        



Nupur Henry MD               Systemic lupus erythematosus, unspecified SLE type, unspecified

 organ involvement status (Primary Dx); 

Inflammatory arthritis; 

Urticaria, unspecified; 

Bilateral calcific tendinitis of shoulders; 

Abnormal chest x-ray; 

DDD (degenerative disc disease), lumbar; 

Abnormal LFTs; 

Sjogren's syndrome with keratoconjunctivitis sicca; 

Adjustment disorder with depressed mood



                     10/22/2018          Office Visit        Rheumatology  

 

                                        



Eleanor Agarwal MD                       



                     2018          Ancillary           Radiology  



                                         Procedure   

 

                                        



Nupur Henry MD               Systemic lupus erythematosus, unspecified SLE type, unspecified

 organ involvement status (Primary Dx); 

Inflammatory arthritis; 

Dietary counseling for Above / Below Normal BMI; 

Exercise counseling for Above Normal BMI Only!; 

BMI 34.0-34.9,adult; 

Arthralgia of shoulder, unspecified laterality; 

Low back pain at multiple sites; 

Screening for tuberculosis



                     2018          Office Visit        Rheumatology  

 

                                        



Evangelina Flores MD                  Insomnia, unspecified type



                     2018          Refill              Psychiatry  

 

                                        



Evangelina Flores MD                  Patient left without being seen (Primary Dx)



                     2018          Office Visit        Psychiatry  

 

                                        



Evangelina Flores MD                  Insomnia, unspecified type; 

MDD (major depressive disorder), recurrent episode, moderate



                     2018          Hospital            Lab  



                                         Encounter   

 

                                        



Evangelina Flores MD                  MDD (major depressive disorder), recurrent episode, moderate

 (Primary Dx); 

Insomnia, unspecified type



                     2018          Office Visit        Psychiatry  

 

                                        



Angelica Palmer               Interpretation



                           2018                Telephone   



after 2018



Social History







                                        Date



                 Tobacco Use     Types           Packs/Day       Years Used 

 

                                         



                                         Never Smoker    

 

    



                                         Smokeless Tobacco: Never   



                                         Used   









                                        Tobacco Cessation: Counseling Given: Yes











                    Drinks/Week         oz/Week             Comments



                                         Alcohol Use   

 

                                                            occasional



                                         Yes   









  



                     Food Insecurity     Answer              Date Recorded

 

  



                     Within the past 12 months, you worried that your     Never true          2018



                                         food would run out before you got money to buy  



                                         more.  

 

  



                     Within the past 12 months, the food you bought     Never true          2018



                                         just didn't last and you didn't have money to get  



                                         more.  









 



                           Sex Assigned at Birth     Date Recorded

 

 



                                         Not on file 









                                        Industry



                           Job Start Date            Occupation 

 

                                        Not on file



                           Not on file               Not on file 









                                        Travel End



                           Travel History            Travel Start 

 





                                         No recent travel history available.







Last Filed Vital Signs







                    Reading             Time Taken          Comments



                                         Vital Sign   

 

                    99/50               2019  8:36 AM CDT     



                                         Blood Pressure   

 

                    56                  2019  8:36 AM CDT     



                                         Pulse   

 

                    36.8 C (98.2 F)    2019  8:36 AM CDT     



                                         Temperature   

 

                    18                  2019  8:36 AM CDT     



                                         Respiratory Rate   

 

                    96%                 2018  2:58 PM CDT     



                                         Oxygen Saturation   

 

                    -                   -                    



                                         Inhaled Oxygen   



                                         Concentration   

 

                    86.6 kg (191 lb)    2019  8:36 AM CDT     



                                         Weight   

 

                    154.9 cm (5' 1")    2019  8:36 AM CDT     



                                         Height   

 

                    36.09               2019  8:36 AM CDT     



                                         Body Mass Index   







Plan of Treatment







                          Care Team                 Description



                     Date                Type                Specialty  

 

                                        



Bree Gómez MD



[address]



[address]



[place] [address]



[phone]                             



                     2019          Office Visit        Psychiatry  









   



                 Health Maintenance     Due Date        Last Done       Comments

 

   



                           Cervical Cancer Scrn (3     1984  



                                         Yrs)   

 

   



                           Breast Cancer Scrn        2003  



                                         (Yearly)   

 

   



                     Colorectal Cancer Scrn     2013          06/10/2010 



                                         Annual (FIT/FOBT) Age 50   



                                         to 73   

 

   



                           IMM Influenza Seasonal     10/01/2019  



                                         Oct to March (>/=19 yrs)   







Procedures







                                        Comments



                 Procedure Name     Priority        Date/Time       Associated Diagnosis 

 

                                        



Results for this procedure are in the results section.



                 COMPLEMENT C4     Routine         2019      Systemic lupus 



                           10:47 AM CST              erythematosus, 



                                         unspecified SLE type, 



                                         unspecified organ 



                                         involvement status 

 

                                        



Results for this procedure are in the results section.



                 COMPLEMENT C3     Routine         2019      Systemic lupus 



                           10:47 AM CST              erythematosus, 



                                         unspecified SLE type, 



                                         unspecified organ 



                                         involvement status 

 

                                        



Results for this procedure are in the results section.



                 ANTI DSDNA BY CRITHIDIA     Routine         2019      Systemic lupus 



                           10:47 AM CST              erythematosus, 



                                         unspecified SLE type, 



                                         unspecified organ 



                                         involvement status 

 

                                        



Results for this procedure are in the results section.



                 LIVER PROFILE     Routine         2019      Systemic lupus 



                           10:47 AM CST              erythematosus, 



                                         unspecified SLE type, 



                                         unspecified organ 



                                         involvement status 

 

                                        



Results for this procedure are in the results section.



                 C-REACTIVE PROT     Routine         2019      Systemic lupus 



                           10:47 AM CST              erythematosus, 



                                         unspecified SLE type, 



                                         unspecified organ 



                                         involvement status 

 

                                        



Results for this procedure are in the results section.



                 SED RATE        Routine         2019      Systemic lupus 



                           10:47 AM CST              erythematosus, 



                                         unspecified SLE type, 



                                         unspecified organ 



                                         involvement status 

 

                                        



Results for this procedure are in the results section.



                 TOTAL PROTEIN/CREATININE     Routine         2019      Systemic lupus 



                     RATIO, URINE        10:42 AM CST        erythematosus, 



                                         unspecified SLE type, 



                                         unspecified organ 



                                         involvement status 

 

                                         



                     PULMONARY - 6 MINUTE WALK     Routine             2018  



                           EXERCISE TEST - COMPLEX      9:09 AM CST  

 

                                         



                     PULMONARY FUNCTION TEST     Routine             2018  



                                         8:08 AM CST  

 

                                        



Results for this procedure are in the results section.



                 CT CHEST W CONTRAST     Routine         2018      Systemic lupus 



                           10:01 AM CST              erythematosus, 



                                         unspecified SLE type, 



                                         unspecified organ 



                                         involvement status 



                                         Abnormal chest x-ray 

 

                                        



Results for this procedure are in the results section.



                 UREA NITROGEN/CREATININE     Routine         10/31/2018      Systemic lupus 



                           1:59 PM CDT               erythematosus, 



                                         unspecified SLE type, 



                                         unspecified organ 



                                         involvement status 

 

                                        



Results for this procedure are in the results section.



                 ANTI DSDNA BY CRITHIDIA     Routine         10/31/2018      Systemic lupus 



                           1:59 PM CDT               erythematosus, 



                                         unspecified SLE type, 



                                         unspecified organ 



                                         involvement status 

 

                                        



Results for this procedure are in the results section.



                 C-REACTIVE PROT     Routine         10/31/2018      Systemic lupus 



                           1:59 PM CDT               erythematosus, 



                                         unspecified SLE type, 



                                         unspecified organ 



                                         involvement status 

 

                                        



Results for this procedure are in the results section.



                 RA FACTOR       Routine         10/31/2018      Sjogren's syndrome with 



                           1:59 PM CDT               keratoconjunctivitis 



                                         sicca 

 

                                        



Results for this procedure are in the results section.



                 SJOGREN'S AB     Routine         10/31/2018      Sjogren's syndrome with 



                           1:59 PM CDT               keratoconjunctivitis 



                                         sicca 

 

                                        



Results for this procedure are in the results section.



                 CBC/DIFF        Routine         10/31/2018      Systemic lupus 



                           1:59 PM CDT               erythematosus, 



                                         unspecified SLE type, 



                                         unspecified organ 



                                         involvement status 

 

                                        



Results for this procedure are in the results section.



                 LIVER PROFILE     Routine         10/31/2018      Systemic lupus 



                           1:59 PM CDT               erythematosus, 



                                         unspecified SLE type, 



                                         unspecified organ 



                                         involvement status 

 

                                        



Results for this procedure are in the results section.



                 COMPLEMENT C3     Routine         10/31/2018      Systemic lupus 



                           1:59 PM CDT               erythematosus, 



                                         unspecified SLE type, 



                                         unspecified organ 



                                         involvement status 

 

                                        



Results for this procedure are in the results section.



                 COMPLEMENT C4     Routine         10/31/2018      Systemic lupus 



                           1:59 PM CDT               erythematosus, 



                                         unspecified SLE type, 



                                         unspecified organ 



                                         involvement status 

 

                                        



Results for this procedure are in the results section.



                     MARY LOU                 Routine             2018  



                                         10:58 AM CDT  

 

                                        



Results for this procedure are in the results section.



                 TOTAL PROTEIN/CREATININE     Routine         2018      Systemic lupus 



                     RATIO, URINE        10:58 AM CDT        erythematosus, 



                                         unspecified SLE type, 



                                         unspecified organ 



                                         involvement status 

 

                                        



Results for this procedure are in the results section.



                 VIT D, 25-HYDROXY     Routine         2018      Inflammatory arthritis 



                                         10:58 AM CDT  

 

                                        



Results for this procedure are in the results section.



                 CCP IGG ABS     Routine         2018      Systemic lupus 



                           10:58 AM CDT              erythematosus, 



                                         unspecified SLE type, 



                                         unspecified organ 



                                         involvement status 

 

                                        



Results for this procedure are in the results section.



                 COMPLEMENT C4     Routine         2018      Systemic lupus 



                           10:58 AM CDT              erythematosus, 



                                         unspecified SLE type, 



                                         unspecified organ 



                                         involvement status 

 

                                        



Results for this procedure are in the results section.



                 COMPLEMENT C3     Routine         2018      Systemic lupus 



                           10:58 AM CDT              erythematosus, 



                                         unspecified SLE type, 



                                         unspecified organ 



                                         involvement status 

 

                                        



Results for this procedure are in the results section.



                 ANTI DSDNA BY CRITHIDIA     Routine         2018      Systemic lupus 



                           10:58 AM CDT              erythematosus, 



                                         unspecified SLE type, 



                                         unspecified organ 



                                         involvement status 

 

                                        



Results for this procedure are in the results section.



                 C-REACTIVE PROT     Routine         2018      Systemic lupus 



                           10:58 AM CDT              erythematosus, 



                                         unspecified SLE type, 



                                         unspecified organ 



                                         involvement status 

 

                                        



Results for this procedure are in the results section.



                 CBC/DIFF        Routine         2018      Inflammatory arthritis 



                                         10:58 AM CDT  

 

                                        



Results for this procedure are in the results section.



                 COMPREHENSIVE METABOLIC     Routine         2018      Inflammatory arthritis 



                           PANEL                     10:58 AM CDT  

 

                                        



Results for this procedure are in the results section.



                 HEPATITIS PANEL     Routine         2018      Inflammatory arthritis 



                                         10:58 AM CDT  

 

                                        



Results for this procedure are in the results section.



                 XRAY SHOULDER 2 VIEWS MIN     Routine         2018      Arthralgia of shoulder, 



                           10:34 AM CDT              unspecified laterality 

 

                                        



Results for this procedure are in the results section.



                 XRAY SHOULDER 2 VIEWS MIN     Routine         2018      Arthralgia of shoulder, 



                           10:34 AM CDT              unspecified laterality 

 

                                        



Results for this procedure are in the results section.



                 XRAY CHEST 2 VIEWS     Routine         2018      Screening for 



                           10:34 AM CDT              tuberculosis 

 

                                        



Results for this procedure are in the results section.



                 XRAY SPINE LUMBOSACRAL     Routine         2018      Low back pain at multiple 



                     [facility]              10:34 AM CDT        sites 

 

                                        



Results for this procedure are in the results section.



                 VIT D, 25-HYDROXY     Routine         2018      Insomnia, unspecified 



                           4:08 PM CDT               type 



                                         MDD (major depressive 



                                         disorder), recurrent 



                                         episode, moderate 

 

                                        



Results for this procedure are in the results section.



                 COMPREHENSIVE METABOLIC     Routine         2018      Insomnia, unspecified 



                     PANEL               4:08 PM CDT         type 

 

                                        



Results for this procedure are in the results section.



                 CBC/DIFF        Routine         2018      Insomnia, unspecified 



                           4:08 PM CDT               type 

 

                                        



Results for this procedure are in the results section.



                 THYROID STIMULATING     Routine         2018      Insomnia, unspecified 



                     HORMONE (TSH)       4:08 PM CDT         type 



after 2018



Results

* SED RATE (2019 10:47 AM CST)





    



              Component     Value        Ref Range     Performed At     Pathologist



                                         Signature

 

    



                 Sed Rate        43 (H)          <30 mm/Hr       [facility] 



                                         3 













                                         Specimen

 





                                         Blood









   



                 Performing Organization     Address         City[facility] [place]     Phone Number

 

   



                                         [facility]   

 

   



                                         [facility]   





* LIVER PROFILE (2019 10:47 AM CST)



Only the most recent of 2 results within the time period is included.





    



              Component     Value        Ref Range     Performed At     Pathologist



                                         Signature

 

    



                 Protein, Total,     8.6 (H)         6.0 - 8.3 g/dL     [facility] 



                           Serum                     1 

 

    



                 Albumin         4.4             3.7 - 5.3 g/dL     [facility] 



                                         1 

 

    



                 Bilirubin,      0.3             0.2 - 1.2 mg/dL     [facility] 



                           Total                     1 

 

    



                 Alkaline        104             34 - 104 U/L     [facility] 



                           Phosphatase, S            1 

 

    



                 AST (SGOT)      21              13 - 39 U/L     [facility] 



                                         1 

 

    



                 ALT             24              7 - 52 U/L      [facility] 



                                         1 

 

    



                 D Bilirubin     0.1             0.0 - 0.2 mg/dL     [facility] 



                                         1 













                                         Specimen

 





                                         Blood









   



                 Performing Organization     Address         City/[facility] [place]     Phone Number

 

   



                                         [facility]   

 

   



                                         [facility]   





* ANTI DSDNA BY CRITHIDIA (2019 10:47 AM CST)



Only the most recent of 3 results within the time period is included.





    



              Component     Value        Ref Range     Performed At     Pathologist



                                         Signature

 

    



                 Anti dsDNA      Negative        NEG Titer       [facility] 













                                         Specimen

 





                                         Blood









   



                 Performing Organization     Address         City/[facility] [place]     Phone Number

 

   



                                         [facility]   

 

   



                                         [facility]   





* C-REACTIVE PROT (2019 10:47 AM CST)



Only the most recent of 3 results within the time period is included.





    



              Component     Value        Ref Range     Performed At     Pathologist



                                         Signature

 

    



                 C-Reactive Prot     0.8             <10 mg/dL       [facility] 



                                         1 













                                         Specimen

 











   



                 Performing Organization     Address         City/[facility] [place]     Phone Number

 

   



                                         [facility]   

 

   



                                         [facility]   





* COMPLEMENT C4 (2019 10:47 AM CST)



Only the most recent of 3 results within the time period is included.





    



              Component     Value        Ref Range     Performed At     Pathologist



                                         Signature

 

    



                 Complement C4     47.9            19 - 52 mg/dL     [facility] 



                                         1 













                                         Specimen

 





                                         Blood









   



                 Performing Organization     Address         City/State/Zipcode     Phone Number

 

   



                                         [facility]   

 

   



                                         [facility]   





* COMPLEMENT C3 (2019 10:47 AM CST)



Only the most recent of 3 results within the time period is included.





    



              Component     Value        Ref Range     Performed At     Pathologist



                                         Signature

 

    



                 Complement C3     183.1           87 - 200 mg/dL     [facility] 



                                         1 













                                         Specimen

 





                                         Blood









   



                 Performing Organization     Address         City/[facility]     Phone Number

 

   



                                         [facility]   

 

   



                                         [facility]   





* T PROT/CREA RATIO,UR (2019 10:42 AM CST)



Only the most recent of 2 results within the time period is included.





    



              Component     Value        Ref Range     Performed At     Pathologist



                                         Signature

 

    



                 Creatinine,     96.5            20 - 320 mg/dL     [facility] 



                           Urine                     1 

 

    



                 T Prot, Ur      0.25            g/L             [facility] 



                                         1 

 

    



                 T Prot/Crea     0.26            0.0 - 0.5       [facility] 



                           Ratio,Ur                  1 













                                         Specimen

 





                                         Urine









   



                 Performing Organization     Address         City/[facility]     Phone Number

 

   



                                         [facility]   

 

   



                                         [facility]   





* PULMONARY - 6 MINUTE WALK EXERCISE TEST - COMPLEX (2018  9:09 AM CST)









                                         Specimen

 







* PULMONARY FUNCTION TEST (2018  8:08 AM CST)









                                         Specimen

 







* CT CHEST W CONTRAST (2018 10:01 AM CST)









                                         Specimen

 











 



                           Impressions               Performed At

 

 



                           IMPRESSION:               [facility]



                                         1.Lungs are clear. 



                                         2.Nonspecific mildly enlarged left axillary lymph nodes. 



                                         3.Hepatic steatosis. 



                                         Signed By: Fabio Arnett M.D., 2018 10:38 AM 









 



                           Narrative                 Performed At

 

 



                           EXAM:CT CHEST W CONTRAST     [facility]



                                         DATE: 2018 10:02 AM 



                                         INDICATION: sle with abnormal chest x ray 



                                         ADDITIONAL INFORMATION:None. 



                                         TECHNIQUE: Post IV contrast CT chest acquisition with sagittal and 



                                         coronal reformats and axial MIPPED images rendered. 



                                         COMPARISON: Chest x-ray 2018 



                                         DISCUSSION: 



                                         Visualized lower neck. Unremarkable. 



                                         Thyroid.The visible portions are unremarkable. 



                                         Aorta.No evidence of aortic aneurysm or dissection. Mild aortic 



                                         atherosclerotic calcifications. 



                                         Pulmonary arteries.Pulmonary arteries are grossly unremarkable. 



                                         Trachea and proximal airways.Trachea and proximal airways are patent. 



                                         Lungs. Unremarkable. 



                                         Thoracic lymph nodes.No adenopathy is seen. Nonspecific mildly 



                                         enlarged left axillary lymph nodes measuring up to 1.2 cm. 



                                         Visualized subdiaphragmatic structures.Hepatic steatosis. 



                                         Soft tissues.Unremarkable. 



                                         Regional Skeleton.No lytic or blastic lesions. 









                                        Procedure Note

 

                                        



Interface, Rad/Mammog In - 2018 10:43 AM CST





EXAM:  CT CHEST W CONTRAST



DATE: 2018 10:02 AM 



INDICATION: sle with abnormal chest x ray



ADDITIONAL INFORMATION:None.



TECHNIQUE: Post IV contrast CT chest acquisition with sagittal and

coronal reformats and axial MIPPED images rendered.



COMPARISON: Chest x-ray 2018



DISCUSSION:  



Visualized lower neck. Unremarkable.



Thyroid.  The visible portions are unremarkable.



Aorta.No evidence of aortic aneurysm or dissection. Mild aortic

atherosclerotic calcifications.



Pulmonary arteries.Pulmonary arteries are grossly unremarkable.



Trachea and proximal airways.Trachea and proximal airways are patent.



Lungs. Unremarkable.



Thoracic lymph nodes.  No adenopathy is seen. Nonspecific mildly

enlarged left axillary lymph nodes measuring up to 1.2 cm.



Visualized subdiaphragmatic structures.  Hepatic steatosis.



Soft tissues.  Unremarkable.



Regional Skeleton.  No lytic or blastic lesions.



IMPRESSION

IMPRESSION: 

                                        1.  Lungs are clear.

                                        2.  Nonspecific mildly enlarged left axillary lymph nodes.

                                        3.  Hepatic steatosis.



 



Signed By: Fabio Arnett M.D., 2018 10:38 AM











   



                 Performing Organization     Address         [place] [place]     Phone [place]

 

   



                                         [facility]   





* SJOGREN'S AB (10/31/2018  1:59 PM CDT)





    



              Component     Value        Ref Range     Performed At     Pathologist



                                         [facility]

 

    



                     Anti SS/A           >8.0                LABORATORY 



                           Reference range: 0.0 to 0.9      [facility] 



                           Unit: AI                  [place] 

 

    



                     Anti SS/B           >8.0                [facility] 



                           Reference range: 0.0 to 0.9      [facility] 



                           Unit: AI                  [place] 













                                         Specimen

 





                                         Blood Products (Lab Use



                                         Only) - BLOOD









   



                 Performing Organization     Address         [place] [place]     Phone Number

 

   



                                         [facility]   

 

   



                 [facility]     [address] [place] [address] [phone]



                           [place]                   [id number]  





* RA FACTOR (10/31/2018  1:59 PM CDT)





    



              Component     Value        Ref Range     Performed At     Pathologist



                                         [facility]

 

    



                 RA Factor       12              <14 IU/mL       [facility] 



                                         1 













                                         Specimen

 





                                         Blood









   



                 Performing Organization     Address         [place] [place]     Phone Number

 

   



                                         [facility]   

 

   



                                         [facility]   





* CBC/DIFF (10/31/2018  1:59 PM CDT)



Only the most recent of 3 results within the time period is included.





    



              Component     Value        Ref Range     Performed At     Pathologist



                                         [facility]

 

    



                 WBC             7.2             4.5 - 11.0 K/uL     [facility] 



                                         2 

 

    



                 RBC             4.43            4.20 - 5.40 M/uL     [facility] 



                                         2 

 

    



                 Hemoglobin      13.2            12.0 - 16.0 g/dL     [facility] 



                                         2 

 

    



                 Hematocrit      39.4            37.0 - 47.0 %     [facility] 



                                         2 

 

    



                 MCV             89              82 - 92 fL      [facility] 



                                         [facility] 

 

    



                 MCH             29.8            27.0 - 32.0 pg     [facility] 



                                         2 

 

    



                 MCHC            33.5            32.0 - 36.0 g/dL     [facility] 



                                         [facility] 

 

    



                 RDW             45.7            36.4 - 46.3 fL     [facility] 



                                         2 

 

    



                 Platelets       244             150 - 400 K/uL     [facility] 



                                         2 

 

    



                 Mean Platelet     11.6            9.4 - 12.4 fL     [facility] 



                           Volume                    2 

 

    



                     Percent NRBC        0.0                 [facility] 



                                         2 

 

    



                     Absolute NRBC       0.00                [facility] 



                                         2 

 

    



                 Neutrophils     70.0            34.0 - 70.0 %     [facility] 



                                         2 

 

    



                 Lymphs          22.7            20.0 - 50.0 %     [facility] 



                                         2 

 

    



                 Monocytes       5.3             5.0 - 12.0 %     [facility] 



                                         2 

 

    



                 Eos             1.5             0.7 - 5.0 %     [facility] 



                                         2 

 

    



                 Basos           0.4             0.1 - 1.2 %     [facility] 



                                         2 

 

    



                 Immature        0.1             0.0 - 0.5       [facility] 



                           Granulocytes              2 

 

    



                 Neutrophils     5.05            1.56 - 6.13 K/uL     [facility] 



                           (Absolute)                2 

 

    



                 Lymphs          1.64            1.18 - 3.74 K/uL     [facility] 



                           (Absolute)                2 

 

    



                 Monocytes(Absol     0.38 (H)        0.24 - 0.36 K/uL     [facility] 



                           susan)                      2 

 

    



                 Eos (Absolute)     0.11            0.04 - 0.36 K/uL     [facility] 



                                         2 

 

    



                 Baso (Absolute)     0.03            0.01 - 0.08 K/uL     [facility] 



                                         2 

 

    



                 Immature Grans     0.01            0.00 - 0.03 K/uL     [facility] 



                           (Abs)                     2 













                                         Specimen

 





                                         Blood









   



                 Performing Organization     Address         City/State/Zipcode     Phone Number

 

   



                                         [facility]   

 

   



                                         [facility]   





* UREA NITROGEN/CREA (10/31/2018  1:59 PM CDT)





    



              Component     Value        Ref Range     Performed At     Pathologist



                                         Signature

 

    



                 BUN             18              7 - 25 mg/dL     [facility] 



                                         1 

 

    



                 Creatinine      0.60            0.6 - 1.2 mg/dL     [facility] 



                                         1 

 

    



                 GFR, Estimated     >60             mL/min/1.73 m2     [facility] 



                                         1 

 

    



                 eGFR If Africn     >60             mL/min/1.73 m2     [facility] 



                           Am                        1 













                                         Specimen

 





                                         Other (Specify in



                                         Comments)









   



                 Performing Organization     Address         City/State/Zipcode     Phone Number

 

   



                                         [facility]   

 

   



                                         [facility]   





* VIT D, 25-HYDROXY (2018 10:58 AM CDT)



Only the most recent of 2 results within the time period is included.





    



              Component     Value        Ref Range     Performed At     Pathologist



                                         Signature

 

    



                 Vit D,          34.0            30 - 100 ng/mL     [facility] 



                     25-Hydroxy          Comment:            IMMUNOLOGY 



                                         Vitamin D deficiency has been   



                                         defined by the [place] of   



                                         Medicine and   



                                         Endocrine Society guideline   



                                         as a level of serum 25-OH   



                                         Vitamin D less than 20   



                                         ng/mL. The Endocrine Society   



                                         further defines Vitamin D   



                                         insufficiency as a   



                                         level between 21 and 29 ng/mL   



                                         and sufficiency as a level   



                                         between 30 and 100   



                                         ng/mL.   













                                         Specimen

 











   



                 Performing Organization     Address         City/[facility]     Phone Number

 

   



                                         [facility]   

 

   



                                         [facility]   





* COMPREHENSIVE METABOLIC PANEL(DBIL NOT INCLUDED) (2018 10:58 AM CDT)



Only the most recent of 2 results within the time period is included.





    



              Component     Value        Ref Range     Performed At     Pathologist



                                         Signature

 

    



                 Albumin         4.6             3.7 - 5.3 g/dL     [facility] 



                                         1 

 

    



                 Calcium         9.0             8.6 - 10.3 mg/dL     [facility] 



                                         1 

 

    



                 CO2             31              21 - 31 mmol/L     [facility] 



                                         1 

 

    



                 Chloride        99              98 - 107 mmol/L     [facility] 



                                         1 

 

    



                 Creatinine      0.50 (L)        0.6 - 1.2 mg/dL     [facility] 



                                         1 

 

    



                 Glucose         84              70 - 110 mg/dL     [facility] 



                                         1 

 

    



                 Alkaline        90              34 - 104 U/L     [facility] 



                           Phosphatase, S            1 

 

    



                 Potassium       4.6             3.5 - 5.1 mmol/L     [facility] 



                                         1 

 

    



                 Sodium          139             136 - 145 mmol/L     [facility] 



                                         1 

 

    



                 ALT             66 (H)          7 - 52 U/L      [facility] 



                                         1 

 

    



                 AST (SGOT)      55 (H)          13 - 39 U/L     [facility] 



                                         1 

 

    



                 BUN             7               7 - 25 mg/dL     [facility] 



                                         1 

 

    



                 Bilirubin,      0.6             0.2 - 1.2 mg/dL     [facility] 



                           Total                     1 

 

    



                 Protein, Total,     8.1             6.0 - 8.3 g/dL     [facility] 



                           Serum                     1 

 

    



                 GFR, Estimated     >60             mL/min/1.73 m2     [facility] 



                                         1 

 

    



                 eGFR If Africn     >60             mL/min/1.73 m2     [facility] 



                           Am                        1 

 

    



                     Anion Gap           9                   [facility] 



                                         1 













                                         Specimen

 





                                         Blood









   



                 Performing Organization     Address         City/[facility]     Phone Number

 

   



                                         [facility]   

 

   



                                         [facility]   





* CCP IGG ABS (2018 10:58 AM CDT)





    



              Component     Value        Ref Range     Performed At     Pathologist



                                         Signature

 

    



                     CCP Abs IgG/IgA     9                   [facility] 



                           Reference range: 0 to 19      [facility] 



                           Unit: units               [facility] 



                                         (note)   



                                            



                                            



                                         Negative   



                                          <20   



                                            



                                          Weak   



                                         [id number] - 39   



                                            



                                          Moderate   



                                         [id number] - 59   



                                            



                                          Strong   



                                         positive>59   













                                         Specimen

 





                                         Blood









   



                 Performing Organization     Address         City/[facility]     Phone Number

 

   



                                         [facility]   

 

   



                 [facility]     [address] [facility] [place] [address]     

[phone]



                           [place]                   145  





* HEPATITIS PANEL (2018 10:58 AM CDT)





    



              Component     Value        Ref Range     Performed At     Pathologist



                                         Signature

 

    



                 HCV IgG         Negative        NEG             [facility] 



                                         3 

 

    



                 HBsAg           Negative        NEG             [facility] 



                                         3 

 

    



                 HAV, IgM        Negative        NEG             [facility] 



                                         3 

 

    



                 HBcAb, IgM      Negative        NEG             [facility] 



                                         3 













                                         Specimen

 





                                         Blood









   



                 Performing Organization     Address         City/State/[facility]     Phone Number

 

   



                                         [facility]   

 

   



                                         [facility]   





* MARY LOU (2018 10:58 AM CDT)





    



              Component     Value        Ref Range     Performed At     Pathologist



                                         Signature

 

    



                 MARY LOU Screen      Positive (A)     NEG             [facility] 



                                         IMMUNOLOGY 

 

    



                 MARY LOU Pattern     SSA/Ro present, not titered     Pattern         [facility] 



                                         IMMUNOLOGY 













                                         Specimen

 











   



                 Performing Organization     Address         City/State/[facility]     Phone Number

 

   



                                         [facility]   

 

   



                                         [facility]   





* XRAY CHEST 2 VIEWS (2018 10:34 AM CDT)









                                         Specimen

 











 



                           Impressions               Performed At

 

 



                           IMPRESSION:               [facility]



                                         1.Persistent mild right medial infrahilar opacities, likely 



                                         atelectasis/scarring. 



                                         2.No consolidations, cavitary lesions, pleural effusions. CT could be 



                                         obtained for further evaluation if indicated. 



                                         Dictated By: Zachary Haro MD, 2018 11:45 AM 



                                         I have reviewed the study and agree with the findings in this report. 



                                         Signed By: Donavon Gatica MD, 2018 11:46 AM 









 



                           Narrative                 Performed At

 

 



                           EXAMINATION:XRAY CHEST 2 VIEWS     [facility]



                                         INDICATION: screen tb 



                                         COMPARISON:10/21/2012 



                                          



                                         FINDINGS: 



                                         Devices, Lines, and Tubes: None. 



                                         Heart and Mediastinum: Unremarkable. 



                                         Lungs and Pleura: Persistent mild infrahilar opacities in the right 



                                         medial lung. Few bilateral scattered calcified granulomas, the greatest 



                                         is noted to be in the left upper lung. No pneumothoraces. 



                                         Bones and Soft Tissues: Moderate degenerative changes of the superior 



                                         costochondral joints bilaterally. Moderate anterior osteophytosis of the 



                                         mid thoracic spine. 









                                        Procedure Note

 

                                        



[facility], Rad/Mammog In - 2018 11:52 AM CDT



EXAMINATION:  XRAY CHEST 2 VIEWS    



INDICATION: screen tb  



COMPARISON:  10/21/2012

     

FINDINGS: 

Devices, Lines, and Tubes: None.



Heart and Mediastinum: Unremarkable.



Lungs and Pleura: Persistent mild infrahilar opacities in the right

medial lung. Few bilateral scattered calcified granulomas, the greatest

is noted to be in the left upper lung. No pneumothoraces.



Bones and Soft Tissues: Moderate degenerative changes of the superior

costochondral joints bilaterally. Moderate anterior osteophytosis of the

mid thoracic spine.



IMPRESSION

IMPRESSION: 

                                        1.  Persistent mild right medial infrahilar opacities, likely

atelectasis/scarring.

                                        2.  No consolidations, cavitary lesions, pleural effusions. CT could be

obtained for further evaluation if indicated.



Dictated By: Zachary Haro MD, 2018 11:45 AM



I have reviewed the study and agree with the findings in this report.



Signed By: Donavon Gatica MD, 2018 11:46 AM











   



                 Performing Organization     Address         City/State/Zipcode     Phone Number

 

   



                                         [facility]   





* XRAY SHOULDER 2 VIEWS MIN (2018 10:34 AM CDT)



Only the most recent of 2 results within the time period is included.









                                         Specimen

 











 



                           Impressions               Performed At

 

 



                           IMPRESSION:               [facility]



                                         1.No acute radiographic abnormality. 



                                         2.Calcific tendinopathy of the rotator cuff. 



                                         Dictated By: Jessica Moon MD, 2018 11:40 AM 



                                         I have reviewed the study and agree with the findings in this report. 



                                         Signed By: Deonte Barbosa DO, 2018 2:54 PM 









 



                           Narrative                 Performed At

 

 



                           EXAM: RIGHT XRAY SHOULDER 2 VIEWS MIN     [facility]



                                         CLINICAL INDICATION:pain 



                                         COMPARISON: None. 



                                         DISCUSSION: 



                                          



                                         No fracture or dislocation. 



                                         Mild degenerative changes of the shoulder and acromioclavicular joint. 



                                         Calcific tendinopathy the rotator cuff. 









                                        Procedure Note

 

                                        



[facility] Rad/[facility]amm[facility] In - 2018  2:59 PM CDT



EXAM: RIGHT XRAY SHOULDER 2 VIEWS MIN 



CLINICAL INDICATION:  pain 



COMPARISON: None.



DISCUSSION:  

    

No fracture or dislocation. 

Mild degenerative changes of the shoulder and acromioclavicular joint.

Calcific tendinopathy the rotator cuff.



IMPRESSION

IMPRESSION:



                                        1.  No acute radiographic abnormality.

                                        2.  Calcific tendinopathy of the rotator cuff.



Dictated By: Jessica Moon MD, 2018 11:40 AM



I have reviewed the study and agree with the findings in this report.



Signed By: Deonte Barbosa DO, 2018 2:54 PM











   



                 Performing Organization     Address         City/[facility]     Phone Number

 

   



                                         [facility]   





* XRAY SPINE LUMBOSACRAL AP-LAT (2018 10:34 AM CDT)









                                         Specimen

 











 



                           Impressions               Performed At

 

 



                           IMPRESSION:               [facility]



                                         1.No acute radiographic abnormality. 



                                         2.Mild degenerative changes at L5-S1. 



                                         Dictated By: Jessica Moon MD, 2018 11:38 AM 



                                         I have reviewed the study and agree with the findings in this report. 



                                         Signed By: Deonte Barbosa DO, 2018 2:54 PM 









 



                           Narrative                 Performed At

 

 



                           Lumbar Spine Radiographs - 3 view(s)     [facility]



                                         HISTORY:back pain 



                                         COMPARISON: None 



                                         DISCUSSION: 



                                         Bone: 



                                         Osseous structures are partially obscured by stool and bowel gas. 



                                         Five nonrib-bearing lumbar vertebral bodies. 



                                         Normal alignment. 



                                         No displaced fracture or compression deformity. 



                                         Discs: 



                                         Mild disc space narrowing at L5-S1. 



                                         Joints: 



                                         Mild degenerative changes of the L5-S1 facets. 









                                        Procedure Note

 

                                        



[facility], Rad/Mammog In - 2018  2:59 PM CDT



Lumbar Spine Radiographs - 3 view(s)



HISTORY:  back pain



COMPARISON: None



DISCUSSION:

Bone:

Osseous structures are partially obscured by stool and bowel gas.

Five nonrib-bearing lumbar vertebral bodies.

Normal alignment.

No displaced fracture or compression deformity.



Discs:

Mild disc space narrowing at L5-S1.



Joints:

Mild degenerative changes of the L5-S1 facets.



IMPRESSION

IMPRESSION:

                                        1.  No acute radiographic abnormality. 

                                        2.  Mild degenerative changes at L5-S1.



Dictated By: Jessica Moon MD, 2018 11:38 AM



I have reviewed the study and agree with the findings in this report.



Signed By: Deonte Barbosa DO, 2018 2:54 PM











   



                 Performing Organization     Address         City/[facility]     Phone Number

 

   



                                         [facility]   





* TSH (2018  4:08 PM CDT)





    



              Component     Value        Ref Range     Performed At     Pathologist



                                         Signature

 

    



                 TSH             1.87            0.57 - 3.74 uIU/mL     [facility] 



                                         1 













                                         Specimen

 





                                         Blood









   



                 Performing Organization     Address         City/[facility]     Phone Number

 

   



                                         [facility]   

 

   



                                         [facility]   





after 2018



Insurance







                                        Type



            Payer      Benefit     Subscriber ID     Effective     Phone      Address 



                           Plan /                    Dates   



                                         Group     

 

                                         



            PENDING     PENDING     xxxxxxxx     2010-P     [phone]     [address] 



                     [address]                resent              [id number] 



                           MEDICAID                  [place] 



                                         [id number] 

 

                                         



            [facility]AUGHAN [facility]     VAUGHAN     xxxxxxxxxx     2019-1     [phone]     [address]                     [id number] 



                           [place] 



                                         [address] 

 

                                         



            [employer]     VAUGHAN     xxxxxxxxxx     2018-P     [phone]     [address] 



                     [employer]          resent              [id number] 



                           [place] 



                                         [address] 









     



            Guarantor Name     Account     Relation to     Date of     Phone      Billing Address



                     Type                Patient             Birth  

 

     



            EilzabethCrystal macario     Personal/F     Self       1965     [phone]     [address]



                     amily               (Home)              [place] [address]



                                         [phone] 



                                         (Work) 

 

     



            Crystal Elizabeth     Psych      Self       1965     [phone]     [address]



                           (Home)                    [place] [address]



                                         [phone] 



                                         (Work)

## 2019-07-15 NOTE — XMS REPORT
[facility]

                             Created on: 2018



Crystal Elizabeth

External Reference #: [id number]

: 1965

Sex: Female



Demographics







                          Address                   [address] [place] [address]  [address]

 

                          Home Phone                [phone]

 

                          Preferred Language        Unknown

 

                          Marital Status            Unknown

 

                          Spiritism Affiliation     Unknown

 

                          Race                      Unknown

 

                          Ethnic Group              /Latin





Author







                          Author                    Nahid Crystal

 

                          Organization              [employer]

 

                          Address                   Unknown

 

                          Phone                     Unavailable







Care Team Providers







                    Care Team Member Name    Role                Phone

 

                    Nahid Crystal     CP                  Unavailable



                                                                



Allergies

          No Known Allergies                                                    
                                   



Problems

          





             Problem Type    Condition    Code         Onset Dates    Condition Status

 

             Problem      Insomnia     G47.00                    Active

 

             Problem      Vitamin D deficiency    E55.9                     Active

 

             Problem      Depression    F32.9                     Active

 

             Problem      Positive MARY LOU (antinuclear antibody)    R76.8                     Active

 

             Problem      History of abnormal cervical Pap smear    Z87.898                   Active

 

             Problem      Acquired hypothyroidism    E03.9                     Active

 

                    Problem             Recurrent major depressive disorder, remission status unspecified    F33.9

                                                    Active

 

             Problem      Uses Kiswahili as primary spoken language    Z78.9                     Active

 

             Problem      Major depressive disorder, single episode, unspecified    F32.9                     Active



 

             Problem      Prediabetes    R73.03                    Active

 

             Problem      Inflammatory arthritis    M19.90                    Active

 

             Problem      Lupus        M32.9                     Active

 

             Problem      Anxiety      F41.9                     Active



                                                                                
                                                                                
                                              



Medications

          





        Medication    Code System    Code    Instructions    Start Date    End Date    Status    Dosage



 

             Vitamin D (Ergocalciferol)    NDC          79978765284    13175 UNIT Orally once per week    May

 15, 2018           2019      Active              1 capsule



                                                                              



Results

          No Known Results                                                      
             



Summary Purpose

          [employer] Submission

## 2019-07-15 NOTE — OPERATIVE REPORT
DATE OF PROCEDURE:  07/15/2019

 

SURGEON:  Fabrice Starr MD

 

PROCEDURES:  EGD with biopsies and esophageal dilatation and colonoscopy.

 

INDICATIONS:  For EGD are dysphagia indications for colonoscopy Surveillance

colonoscopy, personal history of colon polyps. 

 

MEDICATIONS:  The patient was done under MAC.  Please see anesthesiologist's note.

 

DESCRIPTION OF PROCEDURE:  With the patient in left lateral decubitus position, flexible

fiberoptic Olympus gastroscope was introduced into the esophagus under direct

visualization without any difficulty.  There was some patchy erythema noted in distal

esophagus.  The scope was then advanced with ease into the stomach.  Mucosa overlying

the antrum and the body revealed some diffuse erythema and low-grade to moderate edema.

Biopsies were obtained and sent to stain for H. pylori.  The pylorus was of normal

contour and shape, it was intubated with ease and the scope was advanced all the way to

the second portion of the duodenum.  Biopsies were obtained from the second portion as

well as the duodenal bulb to rule out sprue.  The scope was then withdrawn back into the

stomach and retroflexed and an intact Nissen fundoplication was noted.  The scope was

then straightened out, it was subsequently withdrawn.  The esophagus was then dilated to

size 52-[place].  The patient tolerated the procedure well. 

 

IMPRESSION:  

1. Distal esophagitis, mild.

2. Esophageal stricture at GE junction dilated to size 52-[place].

3. Status post Nissen fundoplication.

4. Gastritis, biopsied, biopsies sent to stain for H. pylori.

5. Rule out sprue.

 

PLAN:  Follow up histology.  Initiate Protonix 40 mg one p.o. q.a.m. a.c.

 

DESCRIPTION OF PROCEDURE:  The patient was then turned around after adequate lubrication

of the anal canal, a flexible fiberoptic Olympus colonoscope was inserted into the

rectum with ease and advanced all the way to the cecum, it was then withdrawn slowly.

Mucosa overlying the cecum, ascending colon, transverse colon, descending, sigmoid, and

rectum appeared to be within normal limits.  The scope was then retroflexed into the

distal rectum and small internal hemorrhoids were noted, none of which was actively

bleeding.  The scope was then straightened out, it was subsequently withdrawn.  The

patient tolerated the procedure well. 

 

IMPRESSION:  Internal hemorrhoids, none actively bleeding.

 

PLAN:  Initiate high-fiber low-fat diet.  Initiate high-fiber supplement.  The patient

might benefit from a followup colonoscopy in 5 years. 

 

 

 

______________________________

MD VITO Wang/MODL

D:  07/15/2019 13:33:17

T:  07/15/2019 15:35:46

Job #:  [id number]

 

cc:            Cynthia Solares DO

## 2019-07-15 NOTE — XMS REPORT
Patient Summary Document

                             Created on: 07/15/2019



ADILSON GRIFFITH

External Reference #: [id number]

: 1965

Sex: Female



Demographics







                          Address                   [address] [place]  [address]

 

                          Home Phone                [phone]

 

                          Preferred Language        Unknown

 

                          Marital Status            Unknown

 

                          Moravian Affiliation     Unknown

 

                          Race                      Unknown

 

                          Ethnic Group              Unknown





Author







                          Author                    [facility]

 

                          [facility]

 

                          Address                   Unknown

 

                          Phone                     Unavailable







Care Team Providers







                    Care Team Member Name    Role                Phone

 

                          Unavailable               Unavailable







Problems

This patient has no known problems.



Allergies, Adverse Reactions, Alerts

This patient has no known allergies or adverse reactions.



Medications

This patient has no known medications.



Encounters







             Start Date/Time    End Date/Time    Encounter Type    Admission Type    [facility]       Care Department     Encounter ID

 

        2019 00:00:00    2019 00:00:00    Outpatient                    [facility]     [id number]

 

        2019-06-10 00:00:00    2019-06-10 00:00:00    Outpatient                    [facility]     [id number]

 

        2019 09:28:55    2019 09:28:55    Outpatient                    [facility]     [id number]

 

        2019 08:35:35    2019 08:35:35    Outpatient                    [facility]     [id number]

 

        2019 00:00:00    2019 00:00:00    Outpatient                    [facility]     [id number]

 

        2019 00:00:00    2019 00:00:00    Outpatient                    [facility]     [id number]

 

        2019 00:00:00    2019 00:00:00    Outpatient                    [facility]     [id number]

 

        2019 10:45:27    2019 10:45:27    Outpatient                    [facility]     [id number]

 

        2019 09:58:27    2019 09:58:27    Outpatient                    [facility]     [id number]

 

        2019 13:29:23    2019 13:29:23    Outpatient                    [facility]     [id number]

 

        2019 13:13:53    2019 13:13:53    Outpatient                    [facility]     [id number]

 

        2019 00:00:00    2019 00:00:00    Outpatient                    [facility]     [id number]

 

        2018 00:00:00    2018 00:00:00    Outpatient                    [facility]     [id number]

 

        2018 00:00:00    2018 00:00:00    Outpatient                    [facility]     [id number]

 

        2018 00:00:00    2018 00:00:00    Outpatient                    [facility]     [id number]

 

        2018 07:53:53    2018 07:53:53    Outpatient                    [facility]     [id number]

 

        2018 00:00:00    2018 00:00:00    Outpatient                    [facility]     [id number]

 

        2018 00:00:00    2018 00:00:00    Outpatient                    [facility]     [id number]

 

        2018 00:00:00    2018 00:00:00    Outpatient                    [facility]     [id number]

 

        2018 08:19:47    2018 08:19:47    Outpatient                    [facility]     [id number]

 

        2018 09:08:44    2018 09:08:44    Outpatient                    [facility]     [id number]

 

        2018 00:00:00    2018 00:00:00    Outpatient                    [facility]     [id number]

 

        2018-10-31 14:03:53    2018-10-31 14:03:53    Outpatient                    [facility]     [id number]

 

        2018-10-22 11:03:06    2018-10-22 11:03:06    Outpatient                    [facility]     [id number]

 

        2018 00:00:00    2018 00:00:00    Outpatient                    [facility]     [id number]

 

        2018 00:00:00    2018 00:00:00    Outpatient                    [facility]     [id number]

 

        2018 11:00:31    2018 11:00:31    Outpatient                    [facility]     [id number]

 

        2018 10:20:58    2018 10:20:58    Outpatient                    [facility]     [id number]

 

        2018 00:00:00    2018 00:00:00    Outpatient                    [facility]     [id number]

 

        2018 00:00:00    2018 00:00:00    Outpatient                    [facility]     [id number]

 

        2018 00:00:00    2018 00:00:00    Outpatient                    [facility]     [id number]

 

        2018 11:49:20    2018 11:49:20    Outpatient                    [facility]     [id number]

 

        2018 15:42:27    2018 15:42:27    Outpatient                    [facility]     [id number]

 

        2018 14:58:07    2018 14:58:07    Outpatient                    [facility]     [id number]

 

        2018 15:10:59    2018 15:10:59    Outpatient                    [facility]     [id number]

## 2019-07-15 NOTE — XMS REPORT
[facility]

                             Created on: 2015



Crystal Elizabeth

External Reference #: [id number]

: 1965

Sex: Female



Demographics







                          Address                   [address] [place]  [address]

 

                          Home Phone                [phone]

 

                          Preferred Language        Unknown

 

                          Marital Status            Unknown

 

                          Temple Affiliation     Unknown

 

                          Race                      Unknown

 

                          Ethnic Group              /Latin





Author







                          Author                    Bianka Rocha

 

                          Organization              [employer]

 

                          Address                   Unknown

 

                          Phone                     Unavailable







Care Team Providers







                    Care Team Member Name    Role                Phone

 

                    Bianka Rocha                          Unavailable



                                                                



Allergies, Adverse Reactions, Alerts

          





                    Substance           Reaction            Event Type

 

                    N.K.D.A.            Info Not Available    Non Drug Allergy



                                                                    



Encounters

          





                    Encounter           Location            Date

 

                    WWE                 [facility]    Dec 29, 2014

 

                          SS STUDY QUESTIONAIRE     [facility]

                                        2015

 

                    LAB RESULTS         [facility]    2015

 

                    Unknown             [facility]    2015



 

                          LEFT KNEE PAIN/ SWELLLING    [facility]

                                        Oct 29, 2014

 

                    physical, wwe       [facility]    Dec 30,

 2015

 

                          Needs call back from Medical Staff    [facility]                             Oct 30, 2014

 

                    Needs referral      [facility]    2014

 

                          insomnia and stress, headaches     [facility]                              Dec 28, 2015

 

                    H A/ SORE THROAT    [facility]    2015

 

                    left eye            [facility]    May 22, 2015





                                                                                
                                                                                
                          



Problems

          





             Problem Type    Condition    ICD-9 Code    Onset Dates    Condition Status

 

             Problem      Anxiety      300.00                    Active

 

             Problem      Vitamin d deficiency    268.9                     Active

 

             Problem      Hyperlipidemia    272.4                     Active

 

             Problem      Menopause    Z78.0                     Active

 

             Assessment    Encounter for screening mammogram for breast cancer    Z12.31                    Active



 

             Problem      Insomnia     G47.00                    Active

 

             Problem      Vitamin D deficiency    E55.9                     Active

 

             Problem      History of abnormal Pap smear    V13.29                    Active

 

             Problem      Positive MARY LOU (antinuclear antibody)    795.79                    Active

 

             Problem      Depression    F32.9                     Active

 

             Problem      Anxiety      F41.9                     Active

 

             Assessment    Vitamin D deficiency    E55.9                     Active

 

             Assessment    Well woman exam with routine gynecological exam    Z01.419                   Active

 

             Assessment    Encounter for screening for malignant neoplasm of colon    Z12.11                    

Active

 

             Assessment    Menopause    Z78.0                     Active

 

             Problem      BMI 31.0-31.9,adult    V85.31                    Active

 

             Problem      Obesity      278.00                    Active

 

             Problem      Spondyloarthropathy    721.90                    Active

 

             Problem      Insomnia     307.41                    Active

 

             Problem      Depression    311                       Active

 

             Problem      Prediabetes    790.29                    Active



                                                                                
                                                                                
                                                                                
                                             



Medications

          





        Medication    Code System    Code    Instructions    Start Date    End Date    Status    Dosage



 

             HydrOXYzine HCl    [facility]     [address]-54    50 mg Orally at hour of sleep    Dec 28,

 2015                                   Active              1 tablet as needed

 

          Alprazolam    [facility]    [address]-61    0.5 MG Orally QHS    2015              Active 

                                        1 tablet

 

          Venlafaxine HCl ER    [facility]    [address]-87    75 mg Orally Once a day                        Active

                                        1 capsule with food



                                                                                
                           



Social History

          





                    Social History Element    Qualifiers          Date Reported

 

                    Last Colonoscopy:    .  12/20/14         Dec 30, 2015

 

                    Ethnicity           .  Status , Is english your primary language? No Turkmen    Dec

 30, 2015

 

                    children            .  3                Dec 30, 2015

 

                    Flu Vaccine:        .  2015             Dec 30, 2015

 

                    Tobacco Use:        .  Are you a: never smoker    Dec 30, 2015

 

                    Marital Status:     .           Dec 30, 2015

 

                    Caffeine intake?    .  Status: Yes, What type: Coffee    Dec 30, 2015

 

                    Do you exercise?    .  Answer: Yes, Type: walking, biking    Dec 30, 2015

 

                    Do you drink alcohol?    .  Status: Yes, How often? Socially    Dec 30, 2015

 

                    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



                                                                                
                                                                                
                          



Vital Signs

          





                          Date/Time:                Dec 30, 2015

 

                          Weight                    159 lbs

 

                          Height                    61 in

 

                          Cardiac Monitoring Heart Rate    64 /min

 

                          Blood Pressure Diastolic    60 mm Hg

 

                          Blood Pressure Systolic    102 mm Hg



                                                                    



Summary Purpose

          [employer]ClinicalWorks Submission

## 2019-07-15 NOTE — XMS REPORT
Cachorro Valentine MD

                             Created on: 2018



ADILSON GRIFFITH

External Reference #: [id number]

: 1965

Sex: Female



Demographics







                          Address                   [address] [place] [address] 

[place], [place]  [id number]

 

                          Home Phone                [phone]

 

                          Preferred Language        Unknown

 

                          Marital Status            Unknown

 

                          Taoist Affiliation     Unknown

 

                          Race                      Unknown

 

                          Ethnic Group              /Latin





Author







                          Cachorro Mitchell

 

                          Organization              [employer]

 

                          Address                   Unknown

 

                          Phone                     Unavailable







Care Team Providers







                    Care Team Member Name    Role                Phone

 

                    Cachorro Valentine     CP                  Unavailable



                                                                



Allergies

          No Known Allergies                                                    
                                   



Problems

          





             Problem Type    Condition    Code         Onset Dates    Condition Status

 

             Problem      Rash         R21                       Active

 

             Problem      Polyarthritis    M13.0                     Active

 

             Problem      De Quervain's tenosynovitis    M65.4                     Active

 

             Problem      Long-term use of high-risk medication    Z79.899                   Active

 

             Problem      Fibromyalgia    M79.7                     Active

 

             Problem      Inflammatory arthritis    M19.90                    Active

 

             Problem      Raised antibody titer    R76.0                     Active

 

             Problem      Screening for osteoporosis    Z13.820                   Active

 

             Problem      Lupus        M32.9                     Active



                                                                                
                                                                                
      



Medications

          No Known Medications                                                  
                           



Results

          No Known Results                                                      
             



Summary Purpose

          [employer]ClinicalWorks Submission

## 2019-07-15 NOTE — XMS REPORT
[facility]

                             Created on: 2018



Crystal Elizabeth

External Reference #: [id number]

: 1965

Sex: Female



Demographics







                          Address                   [address] [place] [address]  [address]

 

                          Home Phone                [phone]

 

                          Preferred Language        Unknown

 

                          Marital Status            Unknown

 

                          Pentecostalism Affiliation     Unknown

 

                          Race                      Unknown

 

                          Ethnic Group              /Latin





Author







                          Author                    Nahid Crystal

 

                          Organization              [employer]

 

                          Address                   Unknown

 

                          Phone                     Unavailable







Care Team Providers







                    Care Team Member Name    Role                Phone

 

                    Nahid Crystal     CP                  Unavailable



                                                                



Allergies, Adverse Reactions, Alerts

          





                    Substance           Reaction            Event Type

 

                    N.K.D.A.            Info Not Available    Non Drug Allergy



                                                                                
       



Problems

          





             Problem Type    Condition    Code         Onset Dates    Condition Status

 

             Assessment    Pain in right leg    M79.604                   Active

 

             Assessment    Pain of left leg    M79.605                   Active

 

                    Assessment          Recurrent major depressive disorder, remission status unspecified    F33.9

                                                    Active

 

             Assessment    Other fatigue    R53.83                    Active

 

             Assessment    Major depressive disorder, single episode, unspecified    F32.9                     Active



 

             Problem      Inflammatory arthritis    M19.90                    Active

 

             Assessment    Uses Nigerian as primary spoken language    Z78.9                     Active

 

             Problem      Lupus        M32.9                     Active

 

             Assessment    History of abnormal cervical Pap smear    Z87.898                   Active

 

             Problem      Anxiety      F41.9                     Active

 

             Problem      Depression    F32.9                     Active

 

             Problem      Insomnia     G47.00                    Active

 

             Problem      History of abnormal cervical Pap smear    Z87.898                   Active

 

             Problem      Major depressive disorder, single episode, unspecified    F32.9                     Active



 

             Assessment    Lupus        M32.9                     Active

 

             Assessment    Inflammatory arthritis    M19.90                    Active

 

             Problem      Positive MARY LOU (antinuclear antibody)    R76.8                     Active

 

             Assessment    Prediabetes    R73.03                    Active

 

             Problem      Uses Nigerian as primary spoken language    Z78.9                     Active

 

             Problem      Vitamin D deficiency    E55.9                     Active

 

             Problem      Prediabetes    R73.03                    Active

 

                    Problem             Recurrent major depressive disorder, remission status unspecified    F33.9

                                                    Active

 

             Assessment    Depression    F32.9                     Active

 

             Assessment    Insomnia     G47.00                    Active

 

             Assessment    Vitamin D deficiency    E55.9                     Active

 

             Assessment    Anxiety      F41.9                     Active

 

             Assessment    Screening for breast cancer    Z12.39                    Active

 

                    Assessment          Encntr for general adult medical exam w/o abnormal findings    Z00.00 

                                                    Active

 

             Assessment    Positive MARY LOU (antinuclear antibody)    R76.8                     Active

 

             Assessment    Screening for colon cancer    Z12.11                    Active



                                                                                
                                                                                
                                                                                
                                                                                
                                                      



Medications

          





        Medication    Code System    Code    Instructions    Start Date    End Date    Status    Dosage



 

        Venlafaxine HCl ER    NDC     44099649593    75 mg Orally Once a day                    Active    1 capsule

 with food

 

        Mirtazapine    NDC     [address]-43    45                      Active    1 tablet before bedtime in 

the evening

 

           HydrOXYzine HCl    NDC        35703650473    50 mg Orally at hour of sleep    Dec 28, 2015    

                          Active                    1 tablet as needed

 

          Trazodone HCl    NDC       96393632867    50 mg Orally Once a day    2018              Active

                                        1 tablet at bedtime as needed

 

        Melatonin    NDC     40390374763    3 MG Orally                     Active    1 capsule at bedtime as

 needed with food



                                                                                
                                                         



Vital Signs

          





                          Date/Time:                2018

 

                          BMI                       32.68 Index

 

                          Weight                    173 lbs

 

                          Height                    61 in

 

                          Temperature               98.1 F

 

                          Cardiac Monitoring Heart Rate    55 /min

 

                          Blood Pressure Diastolic    62 mm Hg

 

                          Blood Pressure Systolic    118 mm Hg



                                                                    



Results

          





           Name       Result     Date       Reference Range    Unit       Abnormality Flag

 

           HEMOCCULT CARD                                                 



                                                                    



Summary Purpose

          [employer] Submission

## 2019-07-15 NOTE — XMS REPORT
[facility]

                             Created on: 2015



Crystal Elizabeth

External Reference #: [id number]

: 1965

Sex: Female



Demographics







                          Address                   [id number] [place] [address] [place]  [address]

 

                          Home Phone                [phone]

 

                          Preferred Language        Unknown

 

                          Marital Status            Unknown

 

                          Gnosticist Affiliation     Unknown

 

                          Race                      Unknown

 

                          Ethnic Group              /Latin





Author







                          Author                    Cynthia Hedrick

 

                          Organization              [employer]

 

                          Address                   Unknown

 

                          Phone                     Unavailable







Care Team Providers







                    Care Team Member Name    Role                Phone

 

                    Cynthia Hedrick                      Unavailable



                                            



Encounters

          





                    Encounter           Location            Date

 

                    [facility]                 [facility]    Dec 29, 2014

 

                          SS STUDY QUESTIONAIRE     [facility]

                                        2015

 

                    LAB RESULTS         [facility]    2015

 

                          LEFT KNEE PAIN/ SWELLLING    [facility]

                                        Oct 29, 2014

 

                          Needs call back from Medical Staff    [facility]                             Oct 30, 2014

 

                    Needs referral      [facility]    2014



                                                                                
                                                         



Social History

          





                    Social History Element    Qualifiers          Date Reported

 

                    children            .  3                2015

 

                    Tobacco Use:        .  Are you a: never smoker    2015

 

                    Marital Status:     .           2015

 

                    Do you drink alcohol?    .  Status: Yes, How often? Socially    2015

 

                    Occupation:         employed.  Housekeeping    2015



                                                                                
                           



Summary Purpose

          [employer] Submission

## 2019-07-15 NOTE — XMS REPORT
[facility]

                             Created on: 2018



Crystal Elizabeth

External Reference #: [id number]

: 1965

Sex: Female



Demographics







                          Address                   [address] [place] [address]  [address]

 

                          Home Phone                [phone]

 

                          Preferred Language        Unknown

 

                          Marital Status            Unknown

 

                          Hinduism Affiliation     Unknown

 

                          Race                      Unknown

 

                          Ethnic Group              /Latin





Author







                          Author                    Ashlee Monsivais

 

                          [facility]              [employer]

 

                          Address                   Unknown

 

                          Phone                     Unavailable







Care Team Providers







                    Care Team Member Name    Role                Phone

 

                    Ashlee Monsivais    CP                  Unavailable



                                                                



Allergies, Adverse Reactions, Alerts

          





                    Substance           Reaction            Event Type

 

                    N.K.D.A.            Info Not Available    Non Drug Allergy



                                                                                
       



Problems

          





             Problem Type    Condition    Code         Onset Dates    Condition Status

 

             Problem      Anxiety      F41.9                     Active

 

             Problem      Depression    F32.9                     Active

 

             Problem      Insomnia     G47.00                    Active

 

             Problem      Positive MARY LOU (antinuclear antibody)    R76.8                     Active

 

             Problem      History of abnormal cervical Pap smear    Z87.898                   Active

 

             Problem      Acquired hypothyroidism    E03.9                     Active

 

             Problem      Uses Ukrainian as primary spoken language    Z78.9                     Active

 

             Problem      Vitamin D deficiency    E55.9                     Active

 

             Problem      Prediabetes    R73.03                    Active

 

                    Problem             Recurrent major depressive disorder, remission status unspecified    F33.9

                                                    Active

 

             Assessment    Vitamin D deficiency    E55.9                     Active

 

             Assessment    Insomnia     G47.00                    Active

 

                    Assessment          Recurrent major depressive disorder, remission status unspecified    F33.9

                                                    Active

 

             Problem      Inflammatory arthritis    M19.90                    Active

 

             Assessment    Acquired hypothyroidism    E03.9                     Active

 

             Problem      Lupus        M32.9                     Active



                                                                                
                                                                                
                                                                            



Medications

          





        Medication    Code System    Code    Instructions    Start Date    End Date    Status    Dosage



 

           HydrOXYzine HCl    NDC        27916228184    50 mg Orally at hour of sleep    Dec 28, 2015    

                          Active                    1 tablet as needed

 

             Vitamin D (Ergocalciferol)    NDC          12949736502    17495 UNIT Orally once per week    May

 15, 2018           2018        Active              1 capsule

 

          Trazodone HCl    NDC       54544873790    50 mg Orally Once a day    2018              Active

                                        2 tablets

 

        Venlafaxine HCl ER    NDC     70359589559    75 mg Orally Once a day                    Active    1 capsule

 with food

 

        Mirtazapine    NDC     [address]-32    45                      Active    1 tablet before bedtime in 

the evening

 

        Synthroid    NDC     21953170515    50 MCG Orally Once a day    May 15, 2018            Active    

1 tablet on an empty stomach in the morning

 

             Citalopram Hydrobromide    NDC          10172345248    20 MG Orally Once a day    May 15, 2018

                                        Active              1 tablet

 

        Amitriptyline HCl    NDC     30046165998    25 MG Orally Once a day                    Active    2 tablet



 

        Melatonin    NDC     63006923035    3 MG Orally                     Active    1 capsule at bedtime as

 needed with food



                                                                                
                                                                                
                



Vital Signs

          





                          Date/Time:                2018

 

                          BMI                       33.63 Index

 

                          Weight                    178 lbs

 

                          Height                    61 in

 

                          Cardiac Monitoring Heart Rate    56 /min

 

                          Blood Pressure Diastolic    60 mm Hg

 

                          Blood Pressure Systolic    98 mm Hg



                                                                              



Results

          No Known Results                                                      
             



Summary Purpose

          [employer] Submission

## 2019-07-15 NOTE — XMS REPORT
[facility]

                             Created on: 2018



Crystal Elizabeth

External Reference #: [id number]

: 1965

Sex: Female



Demographics







                          Address                   [address] [place] [address]  [address]

 

                          Home Phone                [phone]

 

                          Preferred Language        Unknown

 

                          Marital Status            Unknown

 

                          Scientology Affiliation     Unknown

 

                          Race                      Unknown

 

                          Ethnic Group              /Latin





Author







                          Author                    Nahid Crystal

 

                          Organization              [employer]

 

                          Address                   Unknown

 

                          Phone                     Unavailable







Care Team Providers







                    Care Team Member Name    Role                Phone

 

                    Nahid Crystal     CP                  Unavailable



                                                                



Allergies

          No Known Allergies                                                    
                                   



Problems

          





             Problem Type    Condition    Code         Onset Dates    Condition Status

 

             Problem      Insomnia     G47.00                    Active

 

             Problem      Vitamin D deficiency    E55.9                     Active

 

             Problem      Depression    F32.9                     Active

 

             Problem      Positive MARY LOU (antinuclear antibody)    R76.8                     Active

 

             Problem      History of abnormal cervical Pap smear    Z87.898                   Active

 

             Problem      Acquired hypothyroidism    E03.9                     Active

 

                    Problem             Recurrent major depressive disorder, remission status unspecified    F33.9

                                                    Active

 

             Problem      Uses Icelandic as primary spoken language    Z78.9                     Active

 

             Problem      Major depressive disorder, single episode, unspecified    F32.9                     Active



 

             Problem      Prediabetes    R73.03                    Active

 

             Problem      Inflammatory arthritis    M19.90                    Active

 

             Problem      Lupus        M32.9                     Active

 

             Problem      Anxiety      F41.9                     Active



                                                                                
                                                                                
                                              



Medications

          





        Medication    Code System    Code    Instructions    Start Date    End Date    Status    Dosage



 

             Vitamin D (Ergocalciferol)    NDC          59660877132    58244 UNIT Orally once per week    May

 15, 2018           2018        Active              1 capsule



                                                                              



Results

          No Known Results                                                      
             



Summary Purpose

          [employer] Submission

## 2019-07-15 NOTE — XMS REPORT
[facility]

                             Created on: 2018



Crystal Elizabeth

External Reference #: [id number]

: 1965

Sex: Female



Demographics







                          Address                   [address] [place] [address]  [address]

 

                          Home Phone                [phone]

 

                          Preferred Language        Unknown

 

                          Marital Status            Unknown

 

                          Tenriism Affiliation     Unknown

 

                          Race                      Unknown

 

                          Ethnic Group              /Latin





Author







                          Author                    Nahid Crystal

 

                          Organization              [employer]

 

                          Address                   Unknown

 

                          Phone                     Unavailable







Care Team Providers







                    Care Team Member Name    Role                Phone

 

                    Nahid Crystal     CP                  Unavailable



                                                                



Allergies, Adverse Reactions, Alerts

          





                    Substance           Reaction            Event Type

 

                    N.K.D.A.            Info Not Available    Non Drug Allergy



                                                                                
       



Problems

          





             Problem Type    Condition    Code         Onset Dates    Condition Status

 

             Problem      Insomnia     G47.00                    Active

 

             Problem      Vitamin D deficiency    E55.9                     Active

 

             Problem      Depression    F32.9                     Active

 

             Problem      Acquired hypothyroidism    E03.9                     Active

 

             Problem      Positive MARY LOU (antinuclear antibody)    R76.8                     Active

 

             Problem      BMI 34.0-34.9,adult    Z68.34                    Active

 

                    Problem             Recurrent major depressive disorder, remission status unspecified    F33.9

                                                    Active

 

             Problem      Uses [place] as primary spoken language    Z78.9                     Active

 

             Problem      History of abnormal cervical Pap smear    Z87.898                   Active

 

             Problem      Prediabetes    R73.03                    Active

 

             Assessment    Lupus        M32.9                     Active

 

             Assessment    Acquired hypothyroidism    E03.9                     Active

 

             Assessment    Breast cancer screening    Z12.31                    Active

 

             Assessment    Prediabetes    R73.03                    Active

 

             Assessment    Vitamin D deficiency    E55.9                     Active

 

             Problem      Inflammatory arthritis    M19.90                    Active

 

             Assessment    Uses [place] as primary spoken language    Z78.9                     Active

 

             Problem      Lupus        M32.9                     Active

 

             Assessment    BMI 34.0-34.9,adult    Z68.34                    Active

 

             Problem      Anxiety      F41.9                     Active



                                                                                
                                                                                
                                                                                
                                   



Medications

          





        Medication    Code System    Code    Instructions    Start Date    End Date    Status    Dosage



 

        Amitriptyline HCl    NDC     68687502400    25 MG Orally Once a day                    Active    2 tablet



 

             Vitamin D (Ergocalciferol)    NDC          42203530516    29376 UNIT Orally once per week    May

 15, 2018           Aug 01, 2019        Active              1 capsule

 

        Venlafaxine HCl ER    NDC     93957608817    75 mg Orally Once a day                    Active    1 capsule

 with food

 

           HydrOXYzine HCl    NDC        93757263693    50 mg Orally at hour of sleep    Dec 28, 2015    

                          Active                    1 tablet as needed

 

             Citalopram Hydrobromide    NDC          61225874127    10 mg Orally Once a day    May 15, 2018

                                        Active              1 tablet

 

        Synthroid    NDC     03157092928    50 MCG Orally Once a day    May 15, 2018            Active    

1 tablet on an empty stomach in the morning

 

        Mirtazapine    NDC     [address]-63    45                      Active    1 tablet before bedtime in 

the evening

 

        Citalopram Hydrobromide    NDC     69406076227    20 MG                      Active    TAKE 1 TABLET 

BY MOUTH EVERY DAY

 

          Trazodone HCl    NDC       61437264414    50 mg Orally Once a day    2018              Active

                                        2 tablets

 

        Melatonin    NDC     30490157293    3 MG Orally                     Active    1 capsule at bedtime as

 needed with food

 

          MetFORMIN HCl ER    NDC       01338767786    750 MG Orally Once a day    Aug 06, 2018              Active

                                        1 tablet with evening meal



                                                                                
                                                                                
                                    



Vital Signs

          





                          Date/Time:                Aug 06, 2018

 

                          BMI                       34.95 Index

 

                          Weight                    185 lbs

 

                          Height                    61 in

 

                          Temperature               98.4 F

 

                          Cardiac Monitoring Heart Rate    53 /min

 

                          Blood Pressure Diastolic    62 mm Hg

 

                          Blood Pressure Systolic    96 mm Hg



                                                                              



Results

          No Known Results                                                      
             



Summary Purpose

          [employer] Submission

## 2019-07-15 NOTE — XMS REPORT
[facility]

                             Created on: 2018



Elizabeth, Crystal

External Reference #: [id number]

: 1965

Sex: Female



Demographics







                          Address                   [address] [place] [address]  [address]

 

                          Home Phone                [phone]

 

                          Preferred Language        Unknown

 

                          Marital Status            Unknown

 

                          Cheondoism Affiliation     Unknown

 

                          Race                      Unknown

 

                          Ethnic Group              /Latin





Author







                          Author                    Nahid Crystal

 

                          Organization              [employer]

 

                          Address                   Unknown

 

                          Phone                     Unavailable







Care Team Providers







                    Care Team Member Name    Role                Phone

 

                    Nahid Crystal     CP                  Unavailable



                                                                



Allergies, Adverse Reactions, Alerts

          





                    Substance           Reaction            Event Type

 

                    N.K.D.A.            Info Not Available    Non Drug Allergy



                                                                                
       



Problems

          





             Problem Type    Condition    Code         Onset Dates    Condition Status

 

             Problem      Insomnia     G47.00                    Active

 

             Problem      Vitamin D deficiency    E55.9                     Active

 

             Problem      Depression    F32.9                     Active

 

             Problem      Positive MARY LOU (antinuclear antibody)    R76.8                     Active

 

             Assessment    Acquired hypothyroidism    E03.9                     Active

 

             Problem      History of abnormal cervical Pap smear    Z87.898                   Active

 

             Problem      Acquired hypothyroidism    E03.9                     Active

 

                    Problem             Recurrent major depressive disorder, remission status unspecified    F33.9

                                                    Active

 

             Problem      Uses French as primary spoken language    Z78.9                     Active

 

             Problem      Major depressive disorder, single episode, unspecified    F32.9                     Active



 

             Problem      Prediabetes    R73.03                    Active

 

             Assessment    Lupus        M32.9                     Active

 

             Assessment    Insomnia     G47.00                    Active

 

             Assessment    Vitamin D deficiency    E55.9                     Active

 

             Assessment    Inflammatory arthritis    M19.90                    Active

 

             Assessment    History of abnormal cervical Pap smear    Z87.898                   Active

 

             Problem      Inflammatory arthritis    M19.90                    Active

 

             Assessment    Major depressive disorder, single episode, unspecified    F32.9                     Active



 

             Problem      Lupus        M32.9                     Active

 

             Assessment    Uses French as primary spoken language    Z78.9                     Active

 

             Problem      Anxiety      F41.9                     Active



                                                                                
                                                                                
                                                                                
                                             



Medications

          





        Medication    Code System    Code    Instructions    Start Date    End Date    Status    Dosage



 

          Trazodone HCl    NDC       63248238813    50 mg Orally Once a day    2018              Active

                                        2 tablets

 

           HydrOXYzine HCl    NDC        43954690353    50 mg Orally at hour of sleep    Dec 28, 2015    

                          Active                    1 tablet as needed

 

        Mirtazapine    NDC     [address]-08    45                      Active    1 tablet before bedtime in 

the evening

 

        Melatonin    NDC     51242154387    3 MG Orally                     Active    1 capsule at bedtime as

 needed with food

 

        Venlafaxine HCl ER    NDC     06562605258    75 mg Orally Once a day                    Active    1 capsule

 with food

 

        Synthroid    ND     41051703900    50 MCG Orally Once a day    May 15, 2018            Active    

1 tablet on an empty stomach in the morning

 

             Vitamin D (Ergocalciferol)    NDC          65172588863    49161 UNIT Orally daily    May 15, 2018

                    Sept 12, 2018       Active              1 capsule

 

             Citalopram Hydrobromide    NDC          10470525459    10 mg Orally Once a day    May 15, 2018

                                        Active              1 tablet



                                                                                
                                                                                
      



Vital Signs

          





                          Date/Time:                May 15, 2018

 

                          BMI                       33.82 Index

 

                          Weight                    179 lbs

 

                          Height                    61 in

 

                          Temperature               98.2 F

 

                          Cardiac Monitoring Heart Rate    59 /min

 

                          Blood Pressure Diastolic    58 mm Hg

 

                          Blood Pressure Systolic    96 mm Hg



                                                                              



Results

          No Known Results                                                      
             



Summary Purpose

          [employer] Submission

## 2020-08-25 ENCOUNTER — HOSPITAL ENCOUNTER (OUTPATIENT)
Dept: HOSPITAL 88 - PT | Age: 55
LOS: 6 days | End: 2020-08-31
Attending: SPECIALIST
Payer: COMMERCIAL

## 2020-08-25 DIAGNOSIS — S33.8XXD: Primary | ICD-10-CM

## 2020-08-25 DIAGNOSIS — S76.812S: ICD-10-CM

## 2020-08-25 DIAGNOSIS — M54.32: ICD-10-CM

## 2020-08-25 DIAGNOSIS — M62.81: ICD-10-CM

## 2020-08-25 DIAGNOSIS — R26.2: ICD-10-CM

## 2020-09-29 ENCOUNTER — HOSPITAL ENCOUNTER (OUTPATIENT)
Dept: HOSPITAL 88 - PT | Age: 55
LOS: 1 days | End: 2020-09-30
Attending: SPECIALIST
Payer: COMMERCIAL

## 2020-09-29 DIAGNOSIS — M53.86: ICD-10-CM

## 2020-09-29 DIAGNOSIS — R26.2: ICD-10-CM

## 2020-09-29 DIAGNOSIS — M54.32: ICD-10-CM

## 2020-09-29 DIAGNOSIS — S76.812S: ICD-10-CM

## 2020-09-29 DIAGNOSIS — M62.81: ICD-10-CM

## 2020-09-29 DIAGNOSIS — S33.5XXA: Primary | ICD-10-CM

## 2020-09-29 PROCEDURE — 97139 UNLISTED THERAPEUTIC PX: CPT
